# Patient Record
Sex: MALE | Race: WHITE | Employment: FULL TIME | URBAN - METROPOLITAN AREA
[De-identification: names, ages, dates, MRNs, and addresses within clinical notes are randomized per-mention and may not be internally consistent; named-entity substitution may affect disease eponyms.]

---

## 2018-07-16 ENCOUNTER — HOSPITAL ENCOUNTER (EMERGENCY)
Age: 43
Discharge: HOME | End: 2018-07-16
Attending: EMERGENCY MEDICINE
Payer: COMMERCIAL

## 2018-07-16 VITALS
BODY MASS INDEX: 30.46 KG/M2 | SYSTOLIC BLOOD PRESSURE: 131 MMHG | OXYGEN SATURATION: 99 % | HEART RATE: 92 BPM | HEIGHT: 66 IN | RESPIRATION RATE: 16 BRPM | DIASTOLIC BLOOD PRESSURE: 71 MMHG | TEMPERATURE: 98.4 F | WEIGHT: 189.56 LBS

## 2018-07-16 DIAGNOSIS — E86.0 DEHYDRATION: ICD-10-CM

## 2018-07-16 DIAGNOSIS — F10.920 ALCOHOLIC INTOXICATION WITHOUT COMPLICATION (HCC): Primary | ICD-10-CM

## 2018-07-16 LAB
ALBUMIN SERPL-MCNC: 4.1 G/DL (ref 3.5–5)
ALBUMIN/GLOB SERPL: 1 {RATIO} (ref 1.1–2.2)
ALP SERPL-CCNC: 83 U/L (ref 45–117)
ALT SERPL-CCNC: 31 U/L (ref 12–78)
AMPHET UR QL SCN: NEGATIVE
ANION GAP SERPL CALC-SCNC: 14 MMOL/L (ref 5–15)
AST SERPL-CCNC: 25 U/L (ref 15–37)
BARBITURATES UR QL SCN: NEGATIVE
BASOPHILS # BLD: 0 K/UL (ref 0–0.1)
BASOPHILS NFR BLD: 0 % (ref 0–1)
BENZODIAZ UR QL: NEGATIVE
BILIRUB SERPL-MCNC: 0.4 MG/DL (ref 0.2–1)
BUN SERPL-MCNC: 32 MG/DL (ref 6–20)
BUN/CREAT SERPL: 22 (ref 12–20)
CALCIUM SERPL-MCNC: 9 MG/DL (ref 8.5–10.1)
CANNABINOIDS UR QL SCN: NEGATIVE
CHLORIDE SERPL-SCNC: 109 MMOL/L (ref 97–108)
CO2 SERPL-SCNC: 19 MMOL/L (ref 21–32)
COCAINE UR QL SCN: NEGATIVE
CREAT SERPL-MCNC: 1.46 MG/DL (ref 0.7–1.3)
DIFFERENTIAL METHOD BLD: ABNORMAL
DRUG SCRN COMMENT,DRGCM: NORMAL
EOSINOPHIL # BLD: 0 K/UL (ref 0–0.4)
EOSINOPHIL NFR BLD: 0 % (ref 0–7)
ERYTHROCYTE [DISTWIDTH] IN BLOOD BY AUTOMATED COUNT: 12.6 % (ref 11.5–14.5)
ETHANOL SERPL-MCNC: 286 MG/DL
GLOBULIN SER CALC-MCNC: 4 G/DL (ref 2–4)
GLUCOSE SERPL-MCNC: 61 MG/DL (ref 65–100)
HCT VFR BLD AUTO: 42.7 % (ref 36.6–50.3)
HGB BLD-MCNC: 14.5 G/DL (ref 12.1–17)
IMM GRANULOCYTES # BLD: 0 K/UL (ref 0–0.04)
IMM GRANULOCYTES NFR BLD AUTO: 0 % (ref 0–0.5)
LYMPHOCYTES # BLD: 2.6 K/UL (ref 0.8–3.5)
LYMPHOCYTES NFR BLD: 22 % (ref 12–49)
MCH RBC QN AUTO: 32.1 PG (ref 26–34)
MCHC RBC AUTO-ENTMCNC: 34 G/DL (ref 30–36.5)
MCV RBC AUTO: 94.5 FL (ref 80–99)
METHADONE UR QL: NEGATIVE
MONOCYTES # BLD: 0.4 K/UL (ref 0–1)
MONOCYTES NFR BLD: 3 % (ref 5–13)
NEUTS SEG # BLD: 8.9 K/UL (ref 1.8–8)
NEUTS SEG NFR BLD: 75 % (ref 32–75)
NRBC # BLD: 0 K/UL (ref 0–0.01)
NRBC BLD-RTO: 0 PER 100 WBC
OPIATES UR QL: NEGATIVE
PCP UR QL: NEGATIVE
PLATELET # BLD AUTO: 282 K/UL (ref 150–400)
PMV BLD AUTO: 9.7 FL (ref 8.9–12.9)
POTASSIUM SERPL-SCNC: 4 MMOL/L (ref 3.5–5.1)
PROT SERPL-MCNC: 8.1 G/DL (ref 6.4–8.2)
RBC # BLD AUTO: 4.52 M/UL (ref 4.1–5.7)
RBC MORPH BLD: ABNORMAL
SODIUM SERPL-SCNC: 142 MMOL/L (ref 136–145)
WBC # BLD AUTO: 11.9 K/UL (ref 4.1–11.1)

## 2018-07-16 PROCEDURE — 96361 HYDRATE IV INFUSION ADD-ON: CPT

## 2018-07-16 PROCEDURE — 36415 COLL VENOUS BLD VENIPUNCTURE: CPT | Performed by: PHYSICIAN ASSISTANT

## 2018-07-16 PROCEDURE — 96360 HYDRATION IV INFUSION INIT: CPT

## 2018-07-16 PROCEDURE — 80053 COMPREHEN METABOLIC PANEL: CPT | Performed by: PHYSICIAN ASSISTANT

## 2018-07-16 PROCEDURE — 90791 PSYCH DIAGNOSTIC EVALUATION: CPT

## 2018-07-16 PROCEDURE — 80307 DRUG TEST PRSMV CHEM ANLYZR: CPT | Performed by: PHYSICIAN ASSISTANT

## 2018-07-16 PROCEDURE — 99285 EMERGENCY DEPT VISIT HI MDM: CPT

## 2018-07-16 PROCEDURE — 74011250636 HC RX REV CODE- 250/636: Performed by: EMERGENCY MEDICINE

## 2018-07-16 PROCEDURE — 85025 COMPLETE CBC W/AUTO DIFF WBC: CPT | Performed by: PHYSICIAN ASSISTANT

## 2018-07-16 PROCEDURE — 80307 DRUG TEST PRSMV CHEM ANLYZR: CPT | Performed by: EMERGENCY MEDICINE

## 2018-07-16 RX ADMIN — SODIUM CHLORIDE 1000 ML: 900 INJECTION, SOLUTION INTRAVENOUS at 20:27

## 2018-07-16 RX ADMIN — SODIUM CHLORIDE 1000 ML: 900 INJECTION, SOLUTION INTRAVENOUS at 19:38

## 2018-07-16 NOTE — ED NOTES
3:15 PM  I have evaluated the patient as the Provider in Triage. I have reviewed His vital signs and the triage nurse assessment. I have talked with the patient and any available family and advised that I am the provider in triage and have ordered the appropriate study to initiate their work up based on the clinical presentation during my assessment. I have advised that the patient will be accommodated in the Main ED as soon as possible. I have also requested to contact the triage nurse or myself immediately if the patient experiences any changes in their condition during this brief waiting period. Patient reports drinking 350 ml of vodka today, presents for alcohol detox. Endorses baseline back pain and right foot pain. Denies N/V.    VANIA Pretty

## 2018-07-16 NOTE — ED PROVIDER NOTES
HPI Comments: 36 y.o. male with past medical history significant for HTN and a-fib who presents from home accompanied by his wife with chief complaint of alcohol intoxication. Pt states that he has been drinking a pint of EtOH a day for the past 3 days and is trying to quit drinking. He had only previously been sober for one week. Pt reports that he feels sick with shaking if he does not drink and occasionally vomits. He states that he \"can go days without drinking but once (he) starts he can't stop. \" His last drink was just PTA. He has been missing work recently and has had a couple of near-syncopal episodes at work. He reports a hx of DTs. Pt has a hx of HTN and a-fib and takes Amlodipine and Metoprolol daily. No hx of AA or outpatient detox program. There are no other acute medical concerns at this time. Social hx: (+) vape/E-cigarette use; (+) EtOH use (355 mL vodka/day); (+) marijuana use; works in construction    Note written by Ruel Mcclelland, as dictated by Gena Hope MD 7:19 PM    The history is provided by the patient and the spouse. No  was used. Past Medical History:   Diagnosis Date    Alcoholic hepatitis     Atrial fibrillation ([address]     Hypertension     Ill-defined condition     a-fib       History reviewed. No pertinent surgical history. History reviewed. No pertinent family history. Social History     Social History    Marital status:      Spouse name: N/A    Number of children: N/A    Years of education: N/A     Occupational History    Not on file.      Social History Main Topics    Smoking status: Former Smoker    Smokeless tobacco: Never Used    Alcohol use Yes      Comment: 355 ml vodka daily    Drug use: Yes     Special: Marijuana    Sexual activity: Not on file     Other Topics Concern    Not on file     Social History Narrative    No narrative on file         ALLERGIES: Review of patient's allergies indicates no known allergies. Review of Systems   Constitutional: Negative for appetite change, chills and fever. HENT: Negative for rhinorrhea, sore throat and trouble swallowing. Eyes: Negative for photophobia. Respiratory: Negative for cough and shortness of breath. Cardiovascular: Negative for chest pain and palpitations. Gastrointestinal: Negative for abdominal pain, nausea and vomiting. Genitourinary: Negative for dysuria, frequency and hematuria. Musculoskeletal: Negative for arthralgias. Neurological: Negative for dizziness, syncope and weakness. Psychiatric/Behavioral: Positive for behavioral problems (EtOH abuse). The patient is not nervous/anxious. All other systems reviewed and are negative. Vitals:    07/16/18 2000 07/16/18 2015 07/16/18 2030 07/16/18 2045   BP: 120/70 125/71 132/67 116/67   Pulse: 81 89 88 85   Resp: 17 18 12 16   Temp:       SpO2: 96% 94% 96% 96%   Weight:       Height:                Physical Exam   Constitutional: He appears well-developed and well-nourished. Milo complexion. HENT:   Head: Normocephalic and atraumatic. Mouth/Throat: Oropharynx is clear and moist.   Eyes: EOM are normal. Pupils are equal, round, and reactive to light. Neck: Normal range of motion. Neck supple. Cardiovascular: Normal rate, regular rhythm, normal heart sounds and intact distal pulses. Exam reveals no gallop and no friction rub. No murmur heard. Pulmonary/Chest: Effort normal. No respiratory distress. He has no wheezes. He has no rales. Abdominal: Soft. There is no tenderness. There is no rebound. Musculoskeletal: Normal range of motion. He exhibits no tenderness. Neurological: He is alert. No cranial nerve deficit. Motor; symmetric   Skin: No erythema. Psychiatric: He has a normal mood and affect. His behavior is normal.   Nursing note and vitals reviewed.      Note written by Ruel Whelan, as dictated by Carmelita De La Torre MD 7:19 PM    [facility]      ED Course Procedures    PROGRESS NOTE:  8:00 PM  [facility] is evaluating the pt. CONSULT NOTE:  8:13 PM Gena Hope MD spoke with Tesha Yoon for [facility]. Discussed available diagnostic tests and clinical findings. She states that she will have the connect the pt with the [facility] [address].

## 2018-07-17 NOTE — ED NOTES
Pt given discharge instructions. Pt appears alert and oriented. Pt ambulated out of department with steady gait.

## 2018-07-17 NOTE — BSMART NOTE
Patient arrives with family due to alcohol intoxication and needing detox. Patient did not meed criteria for a medical admission and were upset due to being told by insurance to come here. Patient denies suicidal and homicidal ideation. He denies hallucinations and reports that he has been drinking for 25 years and he has decided he needs help. He is currently intoxicated and asked that his wife answer questions an be given information. Wife reports patient becomes very intoxicated and falls and talks to walls and will say he is suicidal but does not do anything and she is tired of taking care of him. She is requesting a residential facility and detox. Discussed options and family is wanting to call [facility] and gave permission for this writer to initiate contact while patient is being medically cleared. Wife contacted by Ann Acosta and patient has a plane flight to [facility] [place] 2[place] for the center there that leaves at 610 tomorrow. Patient and wife are appreciative of efforts and plan and will be discharged once medically cleared.     Marybel Farias [facility]

## 2018-07-17 NOTE — DISCHARGE INSTRUCTIONS
Dehydration: Care Instructions  Your Care Instructions  Dehydration happens when your body loses too much fluid. This might happen when you do not drink enough water or you lose large amounts of fluids from your body because of diarrhea, vomiting, or sweating. Severe dehydration can be life-threatening. Water and minerals called electrolytes help put your body fluids back in balance. Learn the early signs of fluid loss, and drink more fluids to prevent dehydration. Follow-up care is a key part of your treatment and safety. Be sure to make and go to all appointments, and call your doctor if you are having problems. It's also a good idea to know your test results and keep a list of the medicines you take. How can you care for yourself at home? · To prevent dehydration, drink plenty of fluids, enough so that your urine is light yellow or clear like water. Choose water and other caffeine-free clear liquids until you feel better. If you have kidney, heart, or liver disease and have to limit fluids, talk with your doctor before you increase the amount of fluids you drink. · If you do not feel like eating or drinking, try taking small sips of water, sports drinks, or other rehydration drinks. · Get plenty of rest.  To prevent dehydration  · Add more fluids to your diet and daily routine, unless your doctor has told you not to. · During hot weather, drink more fluids. Drink even more fluids if you exercise a lot. Stay away from drinks with alcohol or caffeine. · Watch for the symptoms of dehydration. These include:  ¨ A dry, sticky mouth. ¨ Dark yellow urine, and not much of it. ¨ Dry and sunken eyes. ¨ Feeling very tired. · Learn what problems can lead to dehydration. These include:  ¨ Diarrhea, fever, and vomiting. ¨ Any illness with a fever, such as pneumonia or the flu. ¨ Activities that cause heavy sweating, such as endurance races and heavy outdoor work in hot or humid weather.   ¨ Alcohol or drug abuse or withdrawal.  ¨ Certain medicines, such as cold and allergy pills (antihistamines), diet pills (diuretics), and laxatives. ¨ Certain diseases, such as diabetes, cancer, and heart or kidney disease. When should you call for help? Call 911 anytime you think you may need emergency care. For example, call if:    · You passed out (lost consciousness).    Call your doctor now or seek immediate medical care if:    · You are confused and cannot think clearly.     · You are dizzy or lightheaded, or you feel like you may faint.     · You have signs of needing more fluids. You have sunken eyes and a dry mouth, and you pass only a little dark urine.     · You cannot keep fluids down.    Watch closely for changes in your health, and be sure to contact your doctor if:    · You are not making tears.     · Your skin is very dry and sags slowly back into place after you pinch it.     · Your mouth and eyes are very dry. Where can you learn more? Go to http://wali-isidro.info/. Enter Z316 in the search box to learn more about \"Dehydration: Care Instructions. \"  Current as of: November 20, 2017  Content Version: 11.7  © 4054-6219 [employer]. Care instructions adapted under license by [employer] (which disclaims liability or warranty for this information). If you have questions about a medical condition or this instruction, always ask your healthcare professional. [place] [address] any warranty or liability for your use of this information.

## 2018-11-13 ENCOUNTER — OFFICE VISIT (OUTPATIENT)
Dept: INTERNAL MEDICINE CLINIC | Age: 43
End: 2018-11-13

## 2018-11-13 VITALS
BODY MASS INDEX: 31.5 KG/M2 | OXYGEN SATURATION: 95 % | WEIGHT: 196 LBS | HEART RATE: 113 BPM | SYSTOLIC BLOOD PRESSURE: 122 MMHG | RESPIRATION RATE: 16 BRPM | TEMPERATURE: 99.2 F | HEIGHT: 66 IN | DIASTOLIC BLOOD PRESSURE: 88 MMHG

## 2018-11-13 DIAGNOSIS — I10 ESSENTIAL HYPERTENSION: Primary | ICD-10-CM

## 2018-11-13 DIAGNOSIS — F10.11 ALCOHOL ABUSE, IN REMISSION: ICD-10-CM

## 2018-11-13 DIAGNOSIS — S09.93XA DENTAL INJURY, INITIAL ENCOUNTER: ICD-10-CM

## 2018-11-13 RX ORDER — OXYCODONE AND ACETAMINOPHEN 10; 325 MG/1; MG/1
TABLET ORAL
COMMUNITY
Start: 2018-11-12 | End: 2018-11-28 | Stop reason: ALTCHOICE

## 2018-11-13 RX ORDER — AMLODIPINE AND BENAZEPRIL HYDROCHLORIDE 5; 20 MG/1; MG/1
CAPSULE ORAL
Refills: 11 | COMMUNITY
Start: 2018-10-10 | End: 2019-07-17 | Stop reason: SDUPTHER

## 2018-11-13 RX ORDER — TRAZODONE HYDROCHLORIDE 50 MG/1
TABLET ORAL
Refills: 1 | COMMUNITY
Start: 2018-10-22 | End: 2018-11-28

## 2018-11-13 RX ORDER — METOPROLOL SUCCINATE 25 MG/1
TABLET, EXTENDED RELEASE ORAL
Refills: 10 | COMMUNITY
Start: 2018-10-25 | End: 2019-07-17 | Stop reason: SDUPTHER

## 2018-11-13 RX ORDER — DULOXETIN HYDROCHLORIDE 60 MG/1
CAPSULE, DELAYED RELEASE ORAL
Refills: 1 | COMMUNITY
Start: 2018-10-18 | End: 2019-12-16 | Stop reason: SDDI

## 2018-11-13 RX ORDER — GABAPENTIN 600 MG/1
TABLET ORAL
Refills: 1 | COMMUNITY
Start: 2018-10-22 | End: 2018-11-28 | Stop reason: SDUPTHER

## 2018-11-13 NOTE — PROGRESS NOTES
HISTORY OF PRESENT ILLNESS  Ken Irwin is a 39 y.o. male. HPI Mr. Garry Conklin is seen today for an introductory visit with concerns regarding hypertension and alcoholism. He is kindly referred by his father Shari Colon who has come to my practice for many years. Social history notable for him moving back from the [employer] about a year ago. 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 He is  with one teenage daughter. He goes by [email or web address]. \"     1. Hypertension. Stable on current regimen. 2. Alcoholism. He has had longstanding alcohol abuse. It was affecting his wife, relationships and he finally sought attention with the help of his wife. He was admitted for detox to [facility] over the summer and eventually went to a rehab facility in [place] in July. He is being maintained on Gabapentin and Cymbalta. No aftercare was recommended apparently. I suggested AA or possibly some counseling and he did not feel this was necessary. 3. Preventive medicine. He will have a full physical in six months. Review of systems notable for 6/10 mouth pain. Just this week he broke two front teeth. He struck his mouth on a large countertop that he and his brother were moving. Past Medical History:   Diagnosis Date    Alcohol abuse     Alcoholic hepatitis     Anxiety     Atrial [address] [address]     Dental injury 10/2018    Hypertension     Ill-defined condition     a-fib    Renal failure      History reviewed. No pertinent surgical history.     Current Outpatient Medications   Medication Sig    DULoxetine (CYMBALTA) 60 mg capsule TK ONE C PO  QD    amLODIPine-benazepril (LOTREL) 5-20 mg per capsule TK 1 C PO QD    gabapentin (NEURONTIN) 600 mg tablet TK 1 T PO TID    traZODone (DESYREL) 50 mg tablet TK 1 T PO QHS    oxyCODONE-acetaminophen (PERCOCET 10)  mg per tablet     metoprolol succinate (TOPROL-XL) 25 mg XL tablet TK 1 T PO QD     No current facility-administered medications for this visit. No Known Allergies    Family History   Problem Relation Age of Onset   Michail Schwab Breast Cancer Mother     Hypertension Father     Hypertension Brother     Heart Disease Maternal Grandfather     Alcohol abuse Maternal Grandmother     Alcohol abuse Paternal Grandfather          Review of Systems   Constitutional: Negative for weight loss. Respiratory: Negative. Cardiovascular: Negative for chest pain, palpitations, leg swelling and PND. Musculoskeletal: Negative for myalgias. Neurological: Negative for focal weakness. Psychiatric/Behavioral: Negative for depression. The patient is not nervous/anxious. All other systems reviewed and are negative. Physical Exam   Constitutional: No distress. HENT:   Mouth/Throat: Abnormal dentition. Eyes: Conjunctivae are normal. Right eye exhibits no discharge. Left eye exhibits no discharge. Neck: Neck supple. Carotid bruit is not present. Cardiovascular: Normal rate and regular rhythm. Exam reveals no gallop and no friction rub. No murmur heard. Pulmonary/Chest: Effort normal and breath sounds normal. No respiratory distress. Abdominal: Soft. He exhibits no distension. Musculoskeletal: He exhibits no edema. Lymphadenopathy:     He has no cervical adenopathy. Neurological: He is alert. Skin: Skin is warm and dry. Psychiatric: He has a normal mood and affect. His behavior is normal.   Nursing note and vitals reviewed. ASSESSMENT and PLAN  Diagnoses and all orders for this visit:    1. Essential hypertension- Continue current regimen of prescription and / or OTC medications     2. Alcohol abuse, in remission- Continue current regimen of prescription and / or OTC medications     3. Dental injury, initial encounter- See specialists  as directed.

## 2018-11-28 ENCOUNTER — OFFICE VISIT (OUTPATIENT)
Dept: INTERNAL MEDICINE CLINIC | Age: 43
End: 2018-11-28

## 2018-11-28 VITALS
RESPIRATION RATE: 16 BRPM | BODY MASS INDEX: 33.11 KG/M2 | HEIGHT: 66 IN | DIASTOLIC BLOOD PRESSURE: 90 MMHG | OXYGEN SATURATION: 97 % | HEART RATE: 78 BPM | WEIGHT: 206 LBS | TEMPERATURE: 98.3 F | SYSTOLIC BLOOD PRESSURE: 137 MMHG

## 2018-11-28 DIAGNOSIS — F10.11 ALCOHOL ABUSE, IN REMISSION: ICD-10-CM

## 2018-11-28 DIAGNOSIS — M54.42 ACUTE LEFT-SIDED LOW BACK PAIN WITH LEFT-SIDED SCIATICA: Primary | ICD-10-CM

## 2018-11-28 DIAGNOSIS — F51.04 PSYCHOPHYSIOLOGICAL INSOMNIA: ICD-10-CM

## 2018-11-28 RX ORDER — DICLOFENAC SODIUM 75 MG/1
75 TABLET, DELAYED RELEASE ORAL
Qty: 30 TAB | Refills: 1 | Status: SHIPPED | OUTPATIENT
Start: 2018-11-28 | End: 2019-12-16 | Stop reason: ALTCHOICE

## 2018-11-28 RX ORDER — METHYLPREDNISOLONE 4 MG/1
4 TABLET ORAL
Qty: 1 DOSE PACK | Refills: 0 | Status: SHIPPED | OUTPATIENT
Start: 2018-11-28 | End: 2019-01-28 | Stop reason: ALTCHOICE

## 2018-11-28 RX ORDER — IBUPROFEN 200 MG
200 TABLET ORAL
COMMUNITY
End: 2018-11-28

## 2018-11-28 RX ORDER — GABAPENTIN 600 MG/1
TABLET ORAL
Qty: 90 TAB | Refills: 5 | Status: SHIPPED | OUTPATIENT
Start: 2018-11-28 | End: 2019-06-11 | Stop reason: SDUPTHER

## 2018-11-28 RX ORDER — TRAZODONE HYDROCHLORIDE 100 MG/1
TABLET ORAL
Qty: 30 TAB | Refills: 11 | Status: SHIPPED | OUTPATIENT
Start: 2018-11-28 | End: 2020-04-13 | Stop reason: SDUPTHER

## 2018-11-28 RX ORDER — ACETAMINOPHEN 325 MG/1
TABLET ORAL
Status: ON HOLD | COMMUNITY
End: 2020-09-09

## 2018-11-28 NOTE — PATIENT INSTRUCTIONS
Low Back Pain: Exercises Your Care Instructions Here are some examples of typical rehabilitation exercises for your condition. Start each exercise slowly. Ease off the exercise if you start to have pain. Your doctor or physical therapist will tell you when you can start these exercises and which ones will work best for you. How to do the exercises Press-up 1. Lie on your stomach, supporting your body with your forearms. 2. Press your elbows down into the floor to raise your upper back. As you do this, relax your stomach muscles and allow your back to arch without using your back muscles. As your press up, do not let your hips or pelvis come off the floor. 3. Hold for 15 to 30 seconds, then relax. 4. Repeat 2 to 4 times. Alternate arm and leg (bird dog) exercise 1. Start on the floor, on your hands and knees. 2. Tighten your belly muscles. 3. Raise one leg off the floor, and hold it straight out behind you. Be careful not to let your hip drop down, because that will twist your trunk. 4. Hold for about 6 seconds, then lower your leg and switch to the other leg. 5. Repeat 8 to 12 times on each leg. 6. Over time, work up to holding for 10 to 30 seconds each time. 7. If you feel stable and secure with your leg raised, try raising the opposite arm straight out in front of you at the same time. Knee-to-chest exercise 1. Lie on your back with your knees bent and your feet flat on the floor. 2. Bring one knee to your chest, keeping the other foot flat on the floor (or keeping the other leg straight, whichever feels better on your lower back). 3. Keep your lower back pressed to the floor. Hold for at least 15 to 30 seconds. 4. Relax, and lower the knee to the starting position. 5. Repeat with the other leg. Repeat 2 to 4 times with each leg. 6. To get more stretch, put your other leg flat on the floor while pulling your knee to your chest. 
 
Curl-ups 1. Lie on the floor on your back with your knees bent at a 90-degree angle. Your feet should be flat on the floor, about 12 inches from your buttocks. 2. Cross your arms over your chest. If this bothers your neck, try putting your hands behind your neck (not your head), with your elbows spread apart. 3. Slowly tighten your belly muscles and raise your shoulder blades off the floor. 4. Keep your head in line with your body, and do not press your chin to your chest. 
5. Hold this position for 1 or 2 seconds, then slowly lower yourself back down to the floor. 6. Repeat 8 to 12 times. Pelvic tilt exercise 1. Lie on your back with your knees bent. 2. \"Brace\" your stomach. This means to tighten your muscles by pulling in and imagining your belly button moving toward your spine. You should feel like your back is pressing to the floor and your hips and pelvis are rocking back. 3. Hold for about 6 seconds while you breathe smoothly. 4. Repeat 8 to 12 times. Heel dig bridging 1. Lie on your back with both knees bent and your ankles bent so that only your heels are digging into the floor. Your knees should be bent about 90 degrees. 2. Then push your heels into the floor, squeeze your buttocks, and lift your hips off the floor until your shoulders, hips, and knees are all in a straight line. 3. Hold for about 6 seconds as you continue to breathe normally, and then slowly lower your hips back down to the floor and rest for up to 10 seconds. 4. Do 8 to 12 repetitions. Hamstring stretch in doorway 1. Lie on your back in a doorway, with one leg through the open door. 2. Slide your leg up the wall to straighten your knee. You should feel a gentle stretch down the back of your leg. 3. Hold the stretch for at least 15 to 30 seconds. Do not arch your back, point your toes, or bend either knee. Keep one heel touching the floor and the other heel touching the wall. 4. Repeat with your other leg. 5. Do 2 to 4 times for each leg. Hip flexor stretch 1. Kneel on the floor with one knee bent and one leg behind you. Place your forward knee over your foot. Keep your other knee touching the floor. 2. Slowly push your hips forward until you feel a stretch in the upper thigh of your rear leg. 3. Hold the stretch for at least 15 to 30 seconds. Repeat with your other leg. 4. Do 2 to 4 times on each side. Wall sit 1. Stand with your back 10 to 12 inches away from a wall. 2. Lean into the wall until your back is flat against it. 3. Slowly slide down until your knees are slightly bent, pressing your lower back into the wall. 4. Hold for about 6 seconds, then slide back up the wall. 5. Repeat 8 to 12 times. Follow-up care is a key part of your treatment and safety. Be sure to make and go to all appointments, and call your doctor if you are having problems. It's also a good idea to know your test results and keep a list of the medicines you take. Where can you learn more? Go to http://wali-isidro.info/. Enter B985 in the search box to learn more about \"Low Back Pain: Exercises. \" Current as of: November 29, 2017 Content Version: 11.8 © 0291-7740 [employer]. Care instructions adapted under license by [employer] (which disclaims liability or warranty for this information). If you have questions about a medical condition or this instruction, always ask your healthcare professional. [place] [address] any warranty or liability for your use of this information.

## 2018-11-28 NOTE — PROGRESS NOTES
HISTORY OF PRESENT ILLNESS Michelle Castillo is a 39 y.o. male. Insomnia The history is provided by the patient (having less benefit from 50 mg trazodone). This is a chronic problem. Back Pain The history is provided by the patient. This is a new problem. The current episode started yesterday. The problem has not changed since onset. The problem occurs constantly. Patient reports not work related injury. The pain is associated with a fall (missed last step . Caught himself but wrenched the back). The pain is present in the lower back and left side. The quality of the pain is described as aching. The pain radiates to the left thigh, left knee and left foot. The pain is at a severity of 8/10. The symptoms are aggravated by bending, twisting and certain positions. The pain is the same all the time. Associated symptoms include leg pain. Pertinent negatives include no numbness, no paresthesias, no paresis and no tingling. Associated symptoms comments: Leg pain is r groin, c/w strain. Treatments tried: ibuprofen  The treatment provided no relief. The patient's surgical history non-contributory   Has known DDD, with recurrent strain Review of Systems Musculoskeletal: Positive for back pain. Neurological: Negative for tingling, numbness and paresthesias. Psychiatric/Behavioral: The patient has insomnia. Physical Exam  
Constitutional: No distress. Musculoskeletal:  
     Lumbar back: He exhibits decreased range of motion and tenderness. He exhibits no bony tenderness, no swelling, no deformity and no spasm. Back: 
 
Neurological: He has normal strength. No sensory deficit. Reflex Scores: 
     Patellar reflexes are 3+ on the right side and 3+ on the left side. Normal motor and sensory exam, negative straight leg raising test bilateral   
Nursing note and vitals reviewed. ASSESSMENT and PLAN Diagnoses and all orders for this visit: 
 
 1. Acute left-sided low back pain with left-sided sciatica 
-     methylPREDNISolone (MEDROL DOSEPACK) 4 mg tablet; Take 1 Tab by mouth Specific Days and Specific Times. 
-     diclofenac EC (VOLTAREN) 75 mg EC tablet; Take 1 Tab by mouth two (2) times daily as needed. For pain. Start after completion of 4 days of Medrol - Stretching, See patient instructions 2. Psychophysiological insomnia 
-     traZODone (DESYREL) 100 mg tablet; TK 1 T PO QHS- new strength 3. Alcohol abuse, in remission 
-     gabapentin (NEURONTIN) 600 mg tablet; TK 1 T PO TID

## 2019-01-28 ENCOUNTER — OFFICE VISIT (OUTPATIENT)
Dept: INTERNAL MEDICINE CLINIC | Age: 44
End: 2019-01-28

## 2019-01-28 ENCOUNTER — HOSPITAL ENCOUNTER (OUTPATIENT)
Dept: GENERAL RADIOLOGY | Age: 44
Discharge: HOME | End: 2019-01-28
Attending: INTERNAL MEDICINE
Payer: COMMERCIAL

## 2019-01-28 ENCOUNTER — TELEPHONE (OUTPATIENT)
Dept: INTERNAL MEDICINE CLINIC | Age: 44
End: 2019-01-28

## 2019-01-28 VITALS
DIASTOLIC BLOOD PRESSURE: 91 MMHG | OXYGEN SATURATION: 95 % | HEART RATE: 91 BPM | BODY MASS INDEX: 33.07 KG/M2 | RESPIRATION RATE: 19 BRPM | HEIGHT: 66 IN | WEIGHT: 205.8 LBS | TEMPERATURE: 98.3 F | SYSTOLIC BLOOD PRESSURE: 150 MMHG

## 2019-01-28 DIAGNOSIS — M25.571 ACUTE RIGHT ANKLE PAIN: ICD-10-CM

## 2019-01-28 DIAGNOSIS — M25.571 ACUTE RIGHT ANKLE PAIN: Primary | ICD-10-CM

## 2019-01-28 PROCEDURE — 73610 X-RAY EXAM OF ANKLE: CPT

## 2019-01-28 NOTE — PROGRESS NOTES
Acute Care Note    JOSE MOYER is 39 y.o. male. he presents for evaluation of Foot Injury    The patient was at a store earlier today. He was attempting to retrieve something from a high shelf. He went to stretch for the object and was standing on his toes on the right foot. He felt and heard a pop and had a significant amount of pain thereafter localized to the lateral malleolus. He presents for evaluation of this pain    Prior to Admission medications    Medication Sig Start Date End Date Taking? Authorizing Provider   acetaminophen (TYLENOL) 325 mg tablet Take  by mouth every four (4) hours as needed for Pain. Yes Provider, Historical   gabapentin (NEURONTIN) 600 mg tablet TK 1 T PO TID 11/28/18  Yes Reba Riley MD   diclofenac EC (VOLTAREN) 75 mg EC tablet Take 1 Tab by mouth two (2) times daily as needed. For pain 11/28/18  Yes Reba Riley MD   traZODone (DESYREL) 100 mg tablet TK 1 T PO QHS- new strength 11/28/18  Yes Reba Riley MD   DULoxetine (CYMBALTA) 60 mg capsule TK ONE C PO  QD 10/18/18  Yes Provider, Historical   amLODIPine-benazepril (LOTREL) 5-20 mg per capsule TK 1 C PO QD 10/10/18  Yes Provider, Historical   metoprolol succinate (TOPROL-XL) 25 mg XL tablet TK 1 T PO QD 10/25/18  Yes Provider, Historical         There is no problem list on file for this patient. Review of Systems   Constitutional: Negative. Respiratory: Negative. Cardiovascular: Negative. Musculoskeletal:        Foot pain as per HPI         Visit Vitals  BP (!) 150/91 (BP 1 Location: Right arm, BP Patient Position: Sitting)   Pulse 91   Temp 98.3 °F (36.8 °C) (Oral)   Resp 19   Ht 5' 6\" (1.676 m)   Wt 205 lb 12.8 oz (93.4 kg)   SpO2 95%   BMI 33.22 kg/m²       Physical Exam   Musculoskeletal:   Noted swelling along the lateral aspect of the right foot near the lateral malleolus.   There is some mild to moderate tenderness palpation at the same area ASSESSMENT/PLAN  Diagnoses and all orders for this visit:    1. Acute right ankle pain -unclear etiology for the present pain. I suspect that the patient has not fractured any bones within the ankle. Most likely, the symptoms represent a ligamentous strain. We will obtain an x-ray initially to determine if there does exist a visible injury. -     XR ANKLE RT MIN 3 V; Future         Advised the patient to call back or return to office if symptoms worsen/change/persist.   Discussed expected course/resolution/complications of diagnosis in detail with patient. Medication risks/benefits/costs/interactions/alternatives discussed with patient. The patient was given an after visit summary which includes diagnoses, current medications, & vitals. They expressed understanding with the diagnosis and plan.

## 2019-01-28 NOTE — PROGRESS NOTES
Please inform the patient that the x-ray of his ankle shows no acute abnormality. There is no evidence of soft tissue injury either. For now, this may be a sprain. Please utilize ice and rest as we had discussed. Inform the office if not improving in the next week.

## 2019-01-28 NOTE — PATIENT INSTRUCTIONS
Foot Pain: Care Instructions  Your Care Instructions  Foot injuries that cause pain and swelling are fairly common. Almost all sports or home repair projects can cause a misstep that ends up as foot pain. Normal wear and tear, especially as you get older, also can cause foot pain. Most minor foot injuries will heal on their own, and home treatment is usually all you need to do. If you have a severe injury, you may need tests and treatment. Follow-up care is a key part of your treatment and safety. Be sure to make and go to all appointments, and call your doctor if you are having problems. It's also a good idea to know your test results and keep a list of the medicines you take. How can you care for yourself at home? · Take pain medicines exactly as directed. ? If the doctor gave you a prescription medicine for pain, take it as prescribed. ? If you are not taking a prescription pain medicine, ask your doctor if you can take an over-the-counter medicine. · Rest and protect your foot. Take a break from any activity that may cause pain. · Put ice or a cold pack on your foot for 10 to 20 minutes at a time. Put a thin cloth between the ice and your skin. · Prop up the sore foot on a pillow when you ice it or anytime you sit or lie down during the next 3 days. Try to keep it above the level of your heart. This will help reduce swelling. · Your doctor may recommend that you wrap your foot with an elastic bandage. Keep your foot wrapped for as long as your doctor advises. · If your doctor recommends crutches, use them as directed. · Wear roomy footwear. · As soon as pain and swelling end, begin gentle exercises of your foot. Your doctor can tell you which exercises will help. When should you call for help? Call 911 anytime you think you may need emergency care.  For example, call if:    · Your foot turns pale, white, blue, or cold.    Call your doctor now or seek immediate medical care if:    · You cannot move or stand on your foot.     · Your foot looks twisted or out of its normal position.     · Your foot is not stable when you step down.     · You have signs of infection, such as:  ? Increased pain, swelling, warmth, or redness. ? Red streaks leading from the sore area. ? Pus draining from a place on your foot. ? A fever.     · Your foot is numb or tingly.    Watch closely for changes in your health, and be sure to contact your doctor if:    · You do not get better as expected.     · You have bruises from an injury that last longer than 2 weeks. Where can you learn more? Go to http://wali-isidro.info/. Enter R714 in the search box to learn more about \"Foot Pain: Care Instructions. \"  Current as of: September 20, 2018  Content Version: 11.9  © 8245-9749 [employer]. Care instructions adapted under license by [employer] (which disclaims liability or warranty for this information). If you have questions about a medical condition or this instruction, always ask your healthcare professional. [address] any warranty or liability for your use of this information.

## 2019-01-29 ENCOUNTER — TELEPHONE (OUTPATIENT)
Dept: INTERNAL MEDICINE CLINIC | Age: 44
End: 2019-01-29

## 2019-06-11 ENCOUNTER — TELEPHONE (OUTPATIENT)
Dept: INTERNAL MEDICINE CLINIC | Age: 44
End: 2019-06-11

## 2019-06-11 DIAGNOSIS — F10.11 ALCOHOL ABUSE, IN REMISSION: ICD-10-CM

## 2019-06-11 RX ORDER — GABAPENTIN 600 MG/1
TABLET ORAL
Qty: 90 TAB | Refills: 0 | Status: SHIPPED | OUTPATIENT
Start: 2019-06-11 | End: 2019-06-11 | Stop reason: SDUPTHER

## 2019-06-11 RX ORDER — GABAPENTIN 600 MG/1
TABLET ORAL
Qty: 90 TAB | Refills: 0 | Status: SHIPPED | OUTPATIENT
Start: 2019-06-11 | End: 2019-07-17 | Stop reason: SDUPTHER

## 2019-06-11 NOTE — TELEPHONE ENCOUNTER
Pt called back to say I sent his Rx to wrong pharmacy. Advised him that [facility] was the only pharmacy in his chart and he did not say in message he wanted it to go some where else. He said he forgot to say. Asked since Rx had been sent already [facility] and I would change is pharmacy in chart? He said no. Advised will call judy to cancel med there (done) and send to [facility] as he has requested (done).

## 2019-06-11 NOTE — TELEPHONE ENCOUNTER
----- Message from Vanesa Almaguer sent at 6/11/2019  7:49 AM EDT -----  Regarding: FW: Dr. Seth Sloan   From [facility]        ----- Message -----  From: Gentry Guallpa  Sent: 6/10/2019   5:03 PM  To: [employer] [facility] [employer]  Subject: Dr. Seth Sloan                            Pt is requesting a Rx refill for gabapentin (NEURONTIN) 600 mg tablet [[id number]] and he would like to contact when the Rx is ready. Best contact number is [phone].

## 2019-07-17 DIAGNOSIS — F10.11 ALCOHOL ABUSE, IN REMISSION: ICD-10-CM

## 2019-07-17 RX ORDER — AMLODIPINE AND BENAZEPRIL HYDROCHLORIDE 5; 20 MG/1; MG/1
CAPSULE ORAL
Qty: 30 CAP | Refills: 0 | Status: SHIPPED | OUTPATIENT
Start: 2019-07-17 | End: 2019-08-21 | Stop reason: SDUPTHER

## 2019-07-17 RX ORDER — METOPROLOL SUCCINATE 25 MG/1
TABLET, EXTENDED RELEASE ORAL
Qty: 30 TAB | Refills: 0 | Status: SHIPPED | OUTPATIENT
Start: 2019-07-17 | End: 2019-12-16 | Stop reason: SDUPTHER

## 2019-07-17 RX ORDER — GABAPENTIN 600 MG/1
TABLET ORAL
Qty: 30 TAB | Refills: 0 | OUTPATIENT
Start: 2019-07-17 | End: 2019-08-21 | Stop reason: SDUPTHER

## 2019-07-17 NOTE — TELEPHONE ENCOUNTER
Genesis Josue (Self) [phone] (H)     Refill on amlodipine, gabapentin and metoprolol to [facility].

## 2019-07-18 NOTE — TELEPHONE ENCOUNTER
Phoned in prescription below to patients pharmacy on file - verbal order received : Dr Mirta Vasquez.

## 2019-08-20 DIAGNOSIS — F10.11 ALCOHOL ABUSE, IN REMISSION: ICD-10-CM

## 2019-08-21 DIAGNOSIS — F10.11 ALCOHOL ABUSE, IN REMISSION: ICD-10-CM

## 2019-08-22 RX ORDER — AMLODIPINE AND BENAZEPRIL HYDROCHLORIDE 5; 20 MG/1; MG/1
CAPSULE ORAL
Qty: 30 CAP | Refills: 0 | Status: SHIPPED | OUTPATIENT
Start: 2019-08-22 | End: 2019-10-09 | Stop reason: SDUPTHER

## 2019-08-22 RX ORDER — GABAPENTIN 600 MG/1
TABLET ORAL
Qty: 90 TAB | Refills: 0 | OUTPATIENT
Start: 2019-08-22 | End: 2019-12-16 | Stop reason: SDUPTHER

## 2019-08-22 RX ORDER — GABAPENTIN 600 MG/1
TABLET ORAL
Qty: 30 TAB | Refills: 0 | OUTPATIENT
Start: 2019-08-22

## 2019-08-22 NOTE — TELEPHONE ENCOUNTER
Send in one month, then call to reschedule follow up as due .  [facility] for phone in gabapentin

## 2019-08-23 NOTE — TELEPHONE ENCOUNTER
Phoned in prescription below to patients pharmacy on file - verbal order received : Dr Ruth Ann Beck.

## 2019-10-08 ENCOUNTER — TELEPHONE (OUTPATIENT)
Dept: INTERNAL MEDICINE CLINIC | Age: 44
End: 2019-10-08

## 2019-10-08 DIAGNOSIS — I10 ESSENTIAL HYPERTENSION: Primary | ICD-10-CM

## 2019-10-08 RX ORDER — AMLODIPINE AND BENAZEPRIL HYDROCHLORIDE 5; 20 MG/1; MG/1
CAPSULE ORAL
Qty: 30 CAP | Refills: 0 | Status: CANCELLED | OUTPATIENT
Start: 2019-10-08

## 2019-10-08 NOTE — TELEPHONE ENCOUNTER
Patient states new insurance. Started new job. Next 30 days will be effective. Need refill on BP medication.

## 2019-10-08 NOTE — TELEPHONE ENCOUNTER
Patient states he is out of his blood pressure med, Amlodipine and needs it. Cannot come in because he is trying to get his insurance straight. Please call.

## 2019-10-09 RX ORDER — AMLODIPINE AND BENAZEPRIL HYDROCHLORIDE 5; 20 MG/1; MG/1
CAPSULE ORAL
Qty: 30 CAP | Refills: 1 | Status: SHIPPED | OUTPATIENT
Start: 2019-10-09 | End: 2019-12-16 | Stop reason: SDUPTHER

## 2019-10-16 DIAGNOSIS — F10.11 ALCOHOL ABUSE, IN REMISSION: ICD-10-CM

## 2019-10-16 RX ORDER — GABAPENTIN 600 MG/1
TABLET ORAL
Qty: 90 TAB | Refills: 0 | OUTPATIENT
Start: 2019-10-16

## 2019-12-16 ENCOUNTER — OFFICE VISIT (OUTPATIENT)
Dept: INTERNAL MEDICINE CLINIC | Age: 44
End: 2019-12-16

## 2019-12-16 VITALS
TEMPERATURE: 98.4 F | HEIGHT: 66 IN | SYSTOLIC BLOOD PRESSURE: 215 MMHG | DIASTOLIC BLOOD PRESSURE: 136 MMHG | BODY MASS INDEX: 33.49 KG/M2 | WEIGHT: 208.4 LBS | OXYGEN SATURATION: 96 % | RESPIRATION RATE: 20 BRPM | HEART RATE: 96 BPM

## 2019-12-16 DIAGNOSIS — I10 ESSENTIAL HYPERTENSION: ICD-10-CM

## 2019-12-16 DIAGNOSIS — F10.11 ALCOHOL ABUSE, IN REMISSION: ICD-10-CM

## 2019-12-16 DIAGNOSIS — I10 ACCELERATED HYPERTENSION: Primary | ICD-10-CM

## 2019-12-16 RX ORDER — AMLODIPINE AND BENAZEPRIL HYDROCHLORIDE 5; 20 MG/1; MG/1
CAPSULE ORAL
Qty: 30 CAP | Refills: 3 | Status: SHIPPED | OUTPATIENT
Start: 2019-12-16 | End: 2020-08-15

## 2019-12-16 RX ORDER — GABAPENTIN 600 MG/1
TABLET ORAL
Qty: 90 TAB | Refills: 0 | Status: SHIPPED | OUTPATIENT
Start: 2019-12-16 | End: 2020-02-21

## 2019-12-16 RX ORDER — METOPROLOL SUCCINATE 25 MG/1
TABLET, EXTENDED RELEASE ORAL
Qty: 30 TAB | Refills: 3 | Status: SHIPPED | OUTPATIENT
Start: 2019-12-16 | End: 2020-06-01

## 2019-12-16 RX ORDER — GABAPENTIN 600 MG/1
TABLET ORAL
Qty: 90 TAB | Refills: 0 | Status: CANCELLED | OUTPATIENT
Start: 2019-12-16

## 2019-12-16 NOTE — PROGRESS NOTES
HISTORY OF PRESENT ILLNESS  Harshal Franco is a 38 y.o. male. HPI Subjective:  Erin Naidu is seen today, overdue for routine follow up with concerns regarding carpal tunnel syndrome and foot pain. 1. His BP is markedly elevated today and he admits to being off his medication for \"a few weeks\". Elevated blood pressure was confirmed x three. He denies any current symptoms of headache, blurred vision, shortness of breath or chest pain. An EKG shows no acute changes. He admits to having a very stressful day. Due to the persistent severe elevation of blood pressure I advised ER transfer, but he declines. He feels all he needs to do is to go home and rest and I will send him his medication, which he promises to start tonight. Will start with Metoprolol 50 mg tonight and restart Lotrel in the morning. Emergency room red flags were reviewed. He seems to understand the stroke risk, which I reviewed with him on several occasions during the visit. 2. Carpal tunnel syndrome. This is chronic. He does use a wrist brace. When he returns tomorrow for follow up we will make orthopedic referral.  Symptoms have worsened with his new job. 3. Foot pain. Hold off on evaluation for now. 4. Alcohol use. He states he has 1-2 drinks now and then, but nothing heavy like he has had in the past.    Social History:  Notable for him having a new job. 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 Past Medical History:  Notable for severe hypertension, currently untreated. See assessment and plan for restart of medications. He agrees to come in tomorrow for a recheck of BP.     Current Outpatient Medications   Medication Sig    gabapentin (NEURONTIN) 600 mg tablet TAKE 1 TABLET BY MOUTH THREE TIMES DAILY    amLODIPine-benazepril (LOTREL) 5-20 mg per capsule TAKE 1 CAPSULE BY MOUTH ONCE DAILY  Indications: high blood pressure    metoprolol succinate (TOPROL-XL) 25 mg XL tablet TK 1 T PO NIGHTLY    acetaminophen (TYLENOL) 325 mg tablet Take  by mouth every four (4) hours as needed for Pain.  traZODone (DESYREL) 100 mg tablet TK 1 T PO QHS- new strength     No current facility-administered medications for this visit. Review of Systems   Constitutional: Negative for weight loss. Eyes: Negative for blurred vision. Respiratory: Negative. Cardiovascular: Negative for chest pain, palpitations, leg swelling and PND. Musculoskeletal: Negative for myalgias. Neurological: Negative for speech change, focal weakness and headaches. All other systems reviewed and are negative. Physical Exam  Vitals signs and nursing note reviewed. Constitutional:       General: He is not in acute distress. Neck:      Vascular: No carotid bruit. Cardiovascular:      Rate and Rhythm: Normal rate and regular rhythm. Heart sounds: No murmur. No friction rub. No gallop. Pulmonary:      Effort: Pulmonary effort is normal. No respiratory distress. Breath sounds: Normal breath sounds. Skin:     General: Skin is warm and dry. Neurological:      Mental Status: He is alert. Psychiatric:         Behavior: Behavior normal.         ASSESSMENT and PLAN  Diagnoses and all orders for this visit:    1. Accelerated hypertension  -     AMB POC EKG ROUTINE W/ 12 LEADS, INTER & REP  -     amLODIPine-benazepril (LOTREL) 5-20 mg per capsule; TAKE 1 CAPSULE BY MOUTH ONCE DAILY  Indications: high blood pressure  -     metoprolol succinate (TOPROL-XL) 25 mg XL tablet; TK 1 T PO NIGHTLY    2.  Alcohol abuse, in remission  -     gabapentin (NEURONTIN) 600 mg tablet; TAKE 1 TABLET BY MOUTH THREE TIMES DAILY    3. Essential hypertension  -     amLODIPine-benazepril (LOTREL) 5-20 mg per capsule; TAKE 1 CAPSULE BY MOUTH ONCE DAILY  Indications: high blood pressure  -     metoprolol succinate (TOPROL-XL) 25 mg XL tablet; TK 1 T PO NIGHTLY

## 2019-12-17 ENCOUNTER — OFFICE VISIT (OUTPATIENT)
Dept: INTERNAL MEDICINE CLINIC | Age: 44
End: 2019-12-17

## 2019-12-17 VITALS
WEIGHT: 210.4 LBS | HEART RATE: 83 BPM | BODY MASS INDEX: 33.82 KG/M2 | SYSTOLIC BLOOD PRESSURE: 164 MMHG | HEIGHT: 66 IN | TEMPERATURE: 98.7 F | DIASTOLIC BLOOD PRESSURE: 118 MMHG | OXYGEN SATURATION: 97 % | RESPIRATION RATE: 16 BRPM

## 2019-12-17 DIAGNOSIS — F10.11 ALCOHOL ABUSE, IN REMISSION: ICD-10-CM

## 2019-12-17 DIAGNOSIS — G89.29 CHRONIC FOOT PAIN, LEFT: ICD-10-CM

## 2019-12-17 DIAGNOSIS — M79.672 CHRONIC FOOT PAIN, LEFT: ICD-10-CM

## 2019-12-17 DIAGNOSIS — G56.01 CARPAL TUNNEL SYNDROME OF RIGHT WRIST: ICD-10-CM

## 2019-12-17 DIAGNOSIS — I10 ACCELERATED HYPERTENSION: Primary | ICD-10-CM

## 2019-12-17 NOTE — PROGRESS NOTES
Chief Complaint   Patient presents with    Hypertension     1. Have you been to the ER, urgent care clinic since your last visit? Hospitalized since your last visit? No    2. Have you seen or consulted any other health care providers outside of the [address] [place] [address] since your last visit? Include any pap smears or colon screening.  No

## 2019-12-17 NOTE — PROGRESS NOTES
HISTORY OF PRESENT ILLNESS  Umberto Palacios is a 38 y.o. male. HPI Subjective:  Holli Hill is seen today for follow up of marked BP elevation and other concerns. 1. Hypertension BP  levels are not optimal of course as he has only had two doses of medication. He denies any symptoms of malignant hypertension. He does feel better today. Will continue with his current regimen and see him back in one month. 2. Carpal tunnel syndrome, right greater than left. Will refer for orthopedic evaluation. 3. Left foot pain. It hurts on the dorsum of his foot. He denies any injury. He denies any swelling and does not relate it to a specific joint. Will refer for podiatry evaluation as he likely will need modified footwear. Social History:  Notable for him being out of health insurance during a job transition. His wife had a job transition as well. Reviewed with him that our practice would not turn him away even if he has no health insurance. Review of Systems   Constitutional: Negative for weight loss. Eyes: Negative for blurred vision. Respiratory: Negative. Cardiovascular: Negative for chest pain, palpitations, leg swelling and PND. Musculoskeletal: Positive for joint pain. Negative for myalgias. Neurological: Negative for focal weakness and headaches. Physical Exam  Vitals signs and nursing note reviewed. Constitutional:       General: He is not in acute distress. Cardiovascular:      Rate and Rhythm: Normal rate and regular rhythm. Pulses:           Radial pulses are 2+ on the right side. Heart sounds: No murmur. No friction rub. No gallop. Pulmonary:      Effort: Pulmonary effort is normal.      Breath sounds: Normal breath sounds. ASSESSMENT and PLAN  Diagnoses and all orders for this visit:    1. Accelerated hypertension  -     METABOLIC PANEL, COMPREHENSIVE  -     CBC WITH AUTOMATED DIFF  -     TSH 3RD GENERATION  -     URINALYSIS W/ RFLX MICROSCOPIC    2. Alcohol abuse, in remission- Continue current regimen of prescription and / or OTC medications , Call with recurrence     3.  Carpal tunnel syndrome of right wrist  -     REFERRAL TO ORTHOPEDIC SURGERY    4. Chronic foot pain, left  -     REFERRAL TO PODIATRY

## 2019-12-18 LAB
ALBUMIN SERPL-MCNC: 4.7 G/DL (ref 3.5–5.5)
ALBUMIN/GLOB SERPL: 2.2 {RATIO} (ref 1.2–2.2)
ALP SERPL-CCNC: 73 IU/L (ref 39–117)
ALT SERPL-CCNC: 18 IU/L (ref 0–44)
APPEARANCE UR: CLEAR
AST SERPL-CCNC: 22 IU/L (ref 0–40)
BASOPHILS # BLD AUTO: 0.1 X10E3/UL (ref 0–0.2)
BASOPHILS NFR BLD AUTO: 0 %
BILIRUB SERPL-MCNC: 0.4 MG/DL (ref 0–1.2)
BILIRUB UR QL STRIP: NEGATIVE
BUN SERPL-MCNC: 15 MG/DL (ref 6–24)
BUN/CREAT SERPL: 21 (ref 9–20)
CALCIUM SERPL-MCNC: 9.4 MG/DL (ref 8.7–10.2)
CHLORIDE SERPL-SCNC: 99 MMOL/L (ref 96–106)
CO2 SERPL-SCNC: 24 MMOL/L (ref 20–29)
COLOR UR: YELLOW
CREAT SERPL-MCNC: 0.73 MG/DL (ref 0.76–1.27)
EOSINOPHIL # BLD AUTO: 0.1 X10E3/UL (ref 0–0.4)
EOSINOPHIL NFR BLD AUTO: 1 %
ERYTHROCYTE [DISTWIDTH] IN BLOOD BY AUTOMATED COUNT: 12.1 % (ref 12.3–15.4)
GLOBULIN SER CALC-MCNC: 2.1 G/DL (ref 1.5–4.5)
GLUCOSE SERPL-MCNC: 102 MG/DL (ref 65–99)
GLUCOSE UR QL: NEGATIVE
HCT VFR BLD AUTO: 49 % (ref 37.5–51)
HGB BLD-MCNC: 17.2 G/DL (ref 13–17.7)
HGB UR QL STRIP: NEGATIVE
IMM GRANULOCYTES # BLD AUTO: 0 X10E3/UL (ref 0–0.1)
IMM GRANULOCYTES NFR BLD AUTO: 0 %
KETONES UR QL STRIP: NEGATIVE
LEUKOCYTE ESTERASE UR QL STRIP: NEGATIVE
LYMPHOCYTES # BLD AUTO: 2 X10E3/UL (ref 0.7–3.1)
LYMPHOCYTES NFR BLD AUTO: 18 %
MCH RBC QN AUTO: 32.3 PG (ref 26.6–33)
MCHC RBC AUTO-ENTMCNC: 35.1 G/DL (ref 31.5–35.7)
MCV RBC AUTO: 92 FL (ref 79–97)
MICRO URNS: NORMAL
MONOCYTES # BLD AUTO: 0.8 X10E3/UL (ref 0.1–0.9)
MONOCYTES NFR BLD AUTO: 7 %
NEUTROPHILS # BLD AUTO: 8.2 X10E3/UL (ref 1.4–7)
NEUTROPHILS NFR BLD AUTO: 74 %
NITRITE UR QL STRIP: NEGATIVE
PH UR STRIP: 5.5 [PH] (ref 5–7.5)
PLATELET # BLD AUTO: 249 X10E3/UL (ref 150–450)
POTASSIUM SERPL-SCNC: 4.5 MMOL/L (ref 3.5–5.2)
PROT SERPL-MCNC: 6.8 G/DL (ref 6–8.5)
PROT UR QL STRIP: NEGATIVE
RBC # BLD AUTO: 5.32 X10E6/UL (ref 4.14–5.8)
SODIUM SERPL-SCNC: 138 MMOL/L (ref 134–144)
SP GR UR: 1.03 (ref 1–1.03)
TSH SERPL DL<=0.005 MIU/L-ACNC: 1.69 UIU/ML (ref 0.45–4.5)
UROBILINOGEN UR STRIP-MCNC: 0.2 MG/DL (ref 0.2–1)
WBC # BLD AUTO: 11.2 X10E3/UL (ref 3.4–10.8)

## 2020-02-21 DIAGNOSIS — F10.11 ALCOHOL ABUSE, IN REMISSION: ICD-10-CM

## 2020-02-21 RX ORDER — GABAPENTIN 600 MG/1
TABLET ORAL
Qty: 90 TAB | Refills: 0 | Status: SHIPPED | OUTPATIENT
Start: 2020-02-21 | End: 2020-03-20

## 2020-04-13 ENCOUNTER — PATIENT MESSAGE (OUTPATIENT)
Dept: INTERNAL MEDICINE CLINIC | Age: 45
End: 2020-04-13

## 2020-04-13 ENCOUNTER — VIRTUAL VISIT (OUTPATIENT)
Dept: INTERNAL MEDICINE CLINIC | Age: 45
End: 2020-04-13

## 2020-04-13 DIAGNOSIS — I10 ESSENTIAL HYPERTENSION: ICD-10-CM

## 2020-04-13 DIAGNOSIS — M54.50 BILATERAL LOW BACK PAIN, UNSPECIFIED CHRONICITY, UNSPECIFIED WHETHER SCIATICA PRESENT: Primary | ICD-10-CM

## 2020-04-13 DIAGNOSIS — F51.04 PSYCHOPHYSIOLOGICAL INSOMNIA: ICD-10-CM

## 2020-04-13 DIAGNOSIS — G89.29 CHRONIC LOW BACK PAIN, UNSPECIFIED BACK PAIN LATERALITY, UNSPECIFIED WHETHER SCIATICA PRESENT: ICD-10-CM

## 2020-04-13 DIAGNOSIS — M54.50 CHRONIC LOW BACK PAIN, UNSPECIFIED BACK PAIN LATERALITY, UNSPECIFIED WHETHER SCIATICA PRESENT: ICD-10-CM

## 2020-04-13 RX ORDER — TRAZODONE HYDROCHLORIDE 100 MG/1
100 TABLET ORAL
Qty: 30 TAB | Refills: 1 | Status: ON HOLD | OUTPATIENT
Start: 2020-04-13 | End: 2020-09-09

## 2020-04-13 RX ORDER — METHYLPREDNISOLONE 4 MG/1
TABLET ORAL
Qty: 1 DOSE PACK | Refills: 0 | Status: SHIPPED | OUTPATIENT
Start: 2020-04-13 | End: 2020-08-21 | Stop reason: ALTCHOICE

## 2020-04-13 NOTE — PROGRESS NOTES
Gisela Rushing is a 38 y.o. male who was seen by synchronous (real-time) audio-video technology on 4/13/2020. He confirmed that, for purposes of billing, this is a virtual visit with his provider for which we will submit a claim for reimbursement to his insurance company. He is aware that he will be responsible for any copays, coinsurance amounts or other amounts not covered by his insurance company. Do you accept - YES    This visit was completed was completed virtually using [employer]      Assessment & Plan:   Diagnoses and all orders for this visit:    1. Bilateral low back pain, unspecified chronicity, unspecified whether sciatica present  -     methylPREDNISolone (MEDROL DOSEPACK) 4 mg tablet; As directed    2. Chronic low back pain, unspecified back pain laterality, unspecified whether sciatica present    3. Psychophysiological insomnia  -     traZODone (DESYREL) 100 mg tablet; Take 1 Tab by mouth nightly. TK 1 T PO QHS- new strength    4. Essential hypertension            Time-based coding, delete if not needed: I spent at least 15 minutes with this established patient, and >50% of the time was spent counseling and/or coordinating care regarding importance of proper body mechanics, proper use of medications,     Subjective:   Gisela Rushing was seen for Back Pain      37 yo male c/o low back pain. He has had chronic back pain for 15 years and takes Gabapentin 600 mg TID. 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 He reports he tries to be careful to bend his knees when lifting. His back pain is intermittent, across the low back, and at times radiates into the RLE and up the R back \"almost bringing me to me knees\". He has tried ice, heat, various OTC rubs and patches. He had a TENS unit in the past, but felt it to be of little help. He has also used a belt brace in the past - also not of much help.   He has been taking 400 mg Advil twice a day, but doesn't find this to be of much help. Pain is interfering with his sleep. He takes Trazodone intermittently, but is now out of it. He does not check his BP on regular basis. He is on 2 medications for his BP. Imp: Acute on Chronic Low Back Pain    Sleep disturbance - chronic   HTN - on meds - not checking BP    Plan: Medrol dospak (advised no Advil while on this)   Tylenol prn   Moist heat for 20 min per application   Stretching exercise (he reports he does these)   Will refill Trazodone   Letter to stay out of work for 3 days (patient request) - will mail to him   Check BP twice a month   Consider seeing Ortho for further eval    Prior to Admission medications    Medication Sig Start Date End Date Taking? Authorizing Provider   gabapentin (NEURONTIN) 600 mg tablet TAKE 1 TABLET BY MOUTH THREE TIMES DAILY 3/20/20  Yes Breana Riley MD   amLODIPine-benazepril (LOTREL) 5-20 mg per capsule TAKE 1 CAPSULE BY MOUTH ONCE DAILY  Indications: high blood pressure 12/16/19  Yes Breana Riley MD   metoprolol succinate (TOPROL-XL) 25 mg XL tablet TK 1 T PO NIGHTLY 12/16/19  Yes Breana Riley MD   acetaminophen (TYLENOL) 325 mg tablet Take  by mouth every four (4) hours as needed for Pain. Yes Provider, Historical   traZODone (DESYREL) 100 mg tablet TK 1 T PO QHS- new strength 11/28/18   Breana Riley MD       No Known Allergies    There is no problem list on file for this patient. Current Outpatient Medications   Medication Sig Dispense Refill    traZODone (DESYREL) 100 mg tablet Take 1 Tab by mouth nightly.  TK 1 T PO QHS- new strength 30 Tab 1    methylPREDNISolone (MEDROL DOSEPACK) 4 mg tablet As directed 1 Dose Pack 0    gabapentin (NEURONTIN) 600 mg tablet TAKE 1 TABLET BY MOUTH THREE TIMES DAILY 90 Tab 3    amLODIPine-benazepril (LOTREL) 5-20 mg per capsule TAKE 1 CAPSULE BY MOUTH ONCE DAILY  Indications: high blood pressure 30 Cap 3    metoprolol succinate (TOPROL-XL) 25 mg XL tablet TK 1 T PO NIGHTLY 30 Tab 3    acetaminophen (TYLENOL) 325 mg tablet Take  by mouth every four (4) hours as needed for Pain. No Known Allergies       ROS - per HPI      Objective:     General: alert, cooperative, mild distress   Mental  status: Anxious   Eyes:    Mouth:    Neck:    Resp:    Neuro:    Musculoskeletal:    Skin:    Psychiatric: anxious         Due to this being a TeleHealth evaluation, many elements of the physical examination are unable to be assessed. Pursuant to the emergency declaration under the [address] [place] [address] waiver authority and the [employer] Act, this Virtual  Visit was conducted, with patient's consent, to reduce the patient's risk of exposure to COVID-19 and provide continuity of care for an established patient. Services were provided through a video synchronous discussion virtually to substitute for in-person clinic visit. We discussed the expected course, resolution and complications of the diagnosis(es) in detail. Medication risks, benefits, costs, interactions, and alternatives were discussed as indicated. I advised him to contact the office if his condition worsens, changes or fails to improve as anticipated. He expressed understanding with the diagnosis(es) and plan.      Candi Jean NP

## 2020-04-13 NOTE — LETTER
NOTIFICATION RETURN TO WORK / SCHOOL 
 
4/13/2020 2:41 PM 
 
 
Mr. Verona Anthony [facility] [address] To Whom It May Concern: 
 
 
Verona Anthony is currently under the care of [facility]. He will return to work on: Thursday, April 16, 2020. If there are questions or concerns please have the patient contact our office. Sincerely, Candi Jean NP

## 2020-08-15 DIAGNOSIS — I10 ESSENTIAL HYPERTENSION: ICD-10-CM

## 2020-08-15 DIAGNOSIS — I10 ACCELERATED HYPERTENSION: ICD-10-CM

## 2020-08-15 RX ORDER — AMLODIPINE AND BENAZEPRIL HYDROCHLORIDE 5; 20 MG/1; MG/1
CAPSULE ORAL
Qty: 30 CAP | Refills: 0 | Status: SHIPPED | OUTPATIENT
Start: 2020-08-15 | End: 2020-09-19

## 2020-08-21 ENCOUNTER — OFFICE VISIT (OUTPATIENT)
Dept: URGENT CARE | Age: 45
End: 2020-08-21
Payer: COMMERCIAL

## 2020-08-21 VITALS — OXYGEN SATURATION: 98 % | TEMPERATURE: 100.1 F | RESPIRATION RATE: 17 BRPM | HEART RATE: 88 BPM

## 2020-08-21 DIAGNOSIS — Z11.59 SCREENING FOR VIRAL DISEASE: Primary | ICD-10-CM

## 2020-08-21 PROCEDURE — 99203 OFFICE O/P NEW LOW 30 MIN: CPT | Performed by: NURSE PRACTITIONER

## 2020-08-21 NOTE — PROGRESS NOTES
The history is provided by the patient. Patient presented to [facility] for COVID testing. Patient complains of subjective fever, chills, muscle aches, runny nose and headache. Patient denies COVID exposure or SOB. Past Medical History:   Diagnosis Date    Alcohol abuse     Alcoholic hepatitis     Anxiety     Atrial fibrillation ([address]     Dental injury 10/2018    Hypertension     Ill-defined condition     a-fib    Renal failure         History reviewed. No pertinent surgical history.       Family History   Problem Relation Age of Onset   Noreene Peat Breast Cancer Mother     Hypertension Father     Hypertension Brother     Heart Disease Maternal Grandfather     Alcohol abuse Maternal Grandmother     Alcohol abuse Paternal Grandfather         Social History     Socioeconomic History    Marital status:      Spouse name: Not on file    Number of children: Not on file    Years of education: Not on file    Highest education level: Not on file   Occupational History    Not on file   Social Needs    Financial resource strain: Not on file    Food insecurity     Worry: Not on file     Inability: Not on file    Transportation needs     Medical: Not on file     Non-medical: Not on file   Tobacco Use    Smoking status: Current Every Day Smoker     Packs/day: 0.50    Smokeless tobacco: Never Used   Substance and Sexual Activity    Alcohol use: Yes     Frequency: Never     Comment: has not had a drink since 7/16/2018 - rehab    Drug use: No     Types: Marijuana     Comment: former     Sexual activity: Not on file   Lifestyle    Physical activity     Days per week: Not on file     Minutes per session: Not on file    Stress: Not on file   Relationships    Social connections     Talks on phone: Not on file     Gets together: Not on file     Attends Restoration service: Not on file     Active member of club or organization: Not on file     Attends meetings of clubs or organizations: Not on file Relationship status: Not on file    Intimate partner violence     Fear of current or ex partner: Not on file     Emotionally abused: Not on file     Physically abused: Not on file     Forced sexual activity: Not on file   Other Topics Concern    Not on file   Social History Narrative    Not on file                ALLERGIES: Patient has no known allergies. Review of Systems   Constitutional: Positive for fever. HENT: Positive for rhinorrhea. Respiratory: Negative. Cardiovascular: Negative. Gastrointestinal: Negative. Musculoskeletal: Positive for myalgias. Neurological: Positive for headaches. Vitals:    08/21/20 0950   Pulse: 88   Resp: 17   Temp: 100.1 °F (37.8 °C)   SpO2: 98%       Physical Exam  Constitutional:       General: He is not in acute distress. Appearance: Normal appearance. He is not ill-appearing. HENT:      Nose: Congestion present. No rhinorrhea. Mouth/Throat:      Pharynx: No oropharyngeal exudate or posterior oropharyngeal erythema. Cardiovascular:      Rate and Rhythm: Normal rate. Pulmonary:      Effort: Pulmonary effort is normal.      Breath sounds: Normal breath sounds. No wheezing or rhonchi. Neurological:      Mental Status: He is alert and oriented to person, place, and time. Psychiatric:         Mood and Affect: Mood normal.         MDM     Differential Diagnosis; Clinical Impression; Plan:     (Z11.59) Screening for viral disease  (primary encounter diagnosis)  No orders of the defined types were placed in this encounter. Tested patient for COVID-19. Patient given education material.  The condition was discussed with the patient and they understand. If symptoms worsen the pt is to go to the ER. Advised patient to take tylenol for discomfort. Advised to quarantine self.       This patient was seen in [facility] Clinic at [address] [place] [address] Urgent Care while in their vehicle due to COVID-19 pandemic with PPE and focused examination in order to decrease community viral transmission. The patient/guardian gave verbal consent to treat.           Procedures

## 2020-08-23 LAB — SARS-COV-2, NAA: NOT DETECTED

## 2020-09-08 ENCOUNTER — HOSPITAL ENCOUNTER (INPATIENT)
Age: 45
LOS: 4 days | Discharge: HOME | DRG: 897 | End: 2020-09-12
Attending: EMERGENCY MEDICINE | Admitting: PSYCHIATRY & NEUROLOGY
Payer: COMMERCIAL

## 2020-09-08 DIAGNOSIS — F10.10 ALCOHOL ABUSE: ICD-10-CM

## 2020-09-08 DIAGNOSIS — F10.11 ALCOHOL ABUSE, IN REMISSION: ICD-10-CM

## 2020-09-08 DIAGNOSIS — R45.851 SUICIDAL IDEATION: Primary | ICD-10-CM

## 2020-09-08 PROBLEM — F31.9 BIPOLAR 1 DISORDER (HCC): Status: ACTIVE | Noted: 2020-09-08

## 2020-09-08 LAB
ALBUMIN SERPL-MCNC: 4.2 G/DL (ref 3.5–5)
ALBUMIN/GLOB SERPL: 1.1 {RATIO} (ref 1.1–2.2)
ALP SERPL-CCNC: 82 U/L (ref 45–117)
ALT SERPL-CCNC: 28 U/L (ref 12–78)
AMPHET UR QL SCN: NEGATIVE
ANION GAP SERPL CALC-SCNC: 3 MMOL/L (ref 5–15)
APAP SERPL-MCNC: <2 UG/ML (ref 10–30)
AST SERPL-CCNC: 23 U/L (ref 15–37)
BARBITURATES UR QL SCN: NEGATIVE
BASOPHILS # BLD: 0.1 K/UL (ref 0–0.1)
BASOPHILS NFR BLD: 1 % (ref 0–1)
BENZODIAZ UR QL: NEGATIVE
BILIRUB SERPL-MCNC: 0.3 MG/DL (ref 0.2–1)
BUN SERPL-MCNC: 10 MG/DL (ref 6–20)
BUN/CREAT SERPL: 13 (ref 12–20)
CALCIUM SERPL-MCNC: 9.4 MG/DL (ref 8.5–10.1)
CANNABINOIDS UR QL SCN: NEGATIVE
CHLORIDE SERPL-SCNC: 108 MMOL/L (ref 97–108)
CO2 SERPL-SCNC: 31 MMOL/L (ref 21–32)
COCAINE UR QL SCN: NEGATIVE
COMMENT, HOLDF: NORMAL
COVID-19 RAPID TEST, COVR: NOT DETECTED
CREAT SERPL-MCNC: 0.8 MG/DL (ref 0.7–1.3)
DIFFERENTIAL METHOD BLD: ABNORMAL
DRUG SCRN COMMENT,DRGCM: NORMAL
EOSINOPHIL # BLD: 0.1 K/UL (ref 0–0.4)
EOSINOPHIL NFR BLD: 1 % (ref 0–7)
ERYTHROCYTE [DISTWIDTH] IN BLOOD BY AUTOMATED COUNT: 12.6 % (ref 11.5–14.5)
ETHANOL SERPL-MCNC: 243 MG/DL
GLOBULIN SER CALC-MCNC: 3.9 G/DL (ref 2–4)
GLUCOSE SERPL-MCNC: 84 MG/DL (ref 65–100)
HCT VFR BLD AUTO: 50.6 % (ref 36.6–50.3)
HEALTH STATUS, XMCV2T: NORMAL
HGB BLD-MCNC: 17.4 G/DL (ref 12.1–17)
IMM GRANULOCYTES # BLD AUTO: 0 K/UL (ref 0–0.04)
IMM GRANULOCYTES NFR BLD AUTO: 0 % (ref 0–0.5)
LYMPHOCYTES # BLD: 2.8 K/UL (ref 0.8–3.5)
LYMPHOCYTES NFR BLD: 33 % (ref 12–49)
MCH RBC QN AUTO: 33.4 PG (ref 26–34)
MCHC RBC AUTO-ENTMCNC: 34.4 G/DL (ref 30–36.5)
MCV RBC AUTO: 97.1 FL (ref 80–99)
METHADONE UR QL: NEGATIVE
MONOCYTES # BLD: 0.5 K/UL (ref 0–1)
MONOCYTES NFR BLD: 6 % (ref 5–13)
NEUTS SEG # BLD: 5.1 K/UL (ref 1.8–8)
NEUTS SEG NFR BLD: 59 % (ref 32–75)
NRBC # BLD: 0 K/UL (ref 0–0.01)
NRBC BLD-RTO: 0 PER 100 WBC
OPIATES UR QL: NEGATIVE
PCP UR QL: NEGATIVE
PLATELET # BLD AUTO: 267 K/UL (ref 150–400)
PMV BLD AUTO: 8.8 FL (ref 8.9–12.9)
POTASSIUM SERPL-SCNC: 3.6 MMOL/L (ref 3.5–5.1)
PROT SERPL-MCNC: 8.1 G/DL (ref 6.4–8.2)
RBC # BLD AUTO: 5.21 M/UL (ref 4.1–5.7)
SALICYLATES SERPL-MCNC: 3.2 MG/DL (ref 2.8–20)
SAMPLES BEING HELD,HOLD: NORMAL
SODIUM SERPL-SCNC: 142 MMOL/L (ref 136–145)
SOURCE, COVRS: NORMAL
SPECIMEN SOURCE, FCOV2M: NORMAL
SPECIMEN TYPE, XMCV1T: NORMAL
UR CULT HOLD, URHOLD: NORMAL
WBC # BLD AUTO: 8.5 K/UL (ref 4.1–11.1)

## 2020-09-08 PROCEDURE — 90791 PSYCH DIAGNOSTIC EVALUATION: CPT

## 2020-09-08 PROCEDURE — 80053 COMPREHEN METABOLIC PANEL: CPT

## 2020-09-08 PROCEDURE — 99283 EMERGENCY DEPT VISIT LOW MDM: CPT

## 2020-09-08 PROCEDURE — 65220000003 HC RM SEMIPRIVATE PSYCH

## 2020-09-08 PROCEDURE — 80307 DRUG TEST PRSMV CHEM ANLYZR: CPT

## 2020-09-08 PROCEDURE — 87635 SARS-COV-2 COVID-19 AMP PRB: CPT

## 2020-09-08 PROCEDURE — 36415 COLL VENOUS BLD VENIPUNCTURE: CPT

## 2020-09-08 PROCEDURE — 85025 COMPLETE CBC W/AUTO DIFF WBC: CPT

## 2020-09-08 PROCEDURE — 74011250637 HC RX REV CODE- 250/637: Performed by: NURSE PRACTITIONER

## 2020-09-08 RX ORDER — LORAZEPAM 2 MG/ML
1 INJECTION INTRAMUSCULAR
Status: DISCONTINUED | OUTPATIENT
Start: 2020-09-08 | End: 2020-09-12 | Stop reason: HOSPADM

## 2020-09-08 RX ORDER — PHENOBARBITAL 64.8 MG/1
64.8 TABLET ORAL
Status: ACTIVE | OUTPATIENT
Start: 2020-09-08 | End: 2020-09-09

## 2020-09-08 RX ORDER — PHENOBARBITAL 32.4 MG/1
32.4 TABLET ORAL 2 TIMES DAILY
Status: COMPLETED | OUTPATIENT
Start: 2020-09-11 | End: 2020-09-11

## 2020-09-08 RX ORDER — DIPHENHYDRAMINE HYDROCHLORIDE 50 MG/ML
INJECTION, SOLUTION INTRAMUSCULAR; INTRAVENOUS
Status: DISPENSED
Start: 2020-09-08 | End: 2020-09-09

## 2020-09-08 RX ORDER — HALOPERIDOL 5 MG/ML
INJECTION INTRAMUSCULAR
Status: DISPENSED
Start: 2020-09-08 | End: 2020-09-09

## 2020-09-08 RX ORDER — PHENOBARBITAL 64.8 MG/1
64.8 TABLET ORAL 4 TIMES DAILY
Status: COMPLETED | OUTPATIENT
Start: 2020-09-08 | End: 2020-09-09

## 2020-09-08 RX ORDER — DIPHENHYDRAMINE HYDROCHLORIDE 50 MG/ML
50 INJECTION, SOLUTION INTRAMUSCULAR; INTRAVENOUS
Status: DISCONTINUED | OUTPATIENT
Start: 2020-09-08 | End: 2020-09-12 | Stop reason: HOSPADM

## 2020-09-08 RX ORDER — ACETAMINOPHEN 325 MG/1
650 TABLET ORAL
Status: DISCONTINUED | OUTPATIENT
Start: 2020-09-08 | End: 2020-09-12 | Stop reason: HOSPADM

## 2020-09-08 RX ORDER — ADHESIVE BANDAGE
30 BANDAGE TOPICAL DAILY PRN
Status: DISCONTINUED | OUTPATIENT
Start: 2020-09-08 | End: 2020-09-12 | Stop reason: HOSPADM

## 2020-09-08 RX ORDER — PHENOBARBITAL 32.4 MG/1
16.2 TABLET ORAL
Status: DISCONTINUED | OUTPATIENT
Start: 2020-09-11 | End: 2020-09-12 | Stop reason: HOSPADM

## 2020-09-08 RX ORDER — PHENOBARBITAL 32.4 MG/1
32.4 TABLET ORAL 4 TIMES DAILY
Status: COMPLETED | OUTPATIENT
Start: 2020-09-09 | End: 2020-09-10

## 2020-09-08 RX ORDER — PHENOBARBITAL 32.4 MG/1
16.2 TABLET ORAL 2 TIMES DAILY
Status: DISCONTINUED | OUTPATIENT
Start: 2020-09-12 | End: 2020-09-12 | Stop reason: HOSPADM

## 2020-09-08 RX ORDER — LANOLIN ALCOHOL/MO/W.PET/CERES
100 CREAM (GRAM) TOPICAL DAILY
Status: DISCONTINUED | OUTPATIENT
Start: 2020-09-09 | End: 2020-09-12 | Stop reason: HOSPADM

## 2020-09-08 RX ORDER — FOLIC ACID 1 MG/1
1 TABLET ORAL DAILY
Status: DISCONTINUED | OUTPATIENT
Start: 2020-09-09 | End: 2020-09-12 | Stop reason: HOSPADM

## 2020-09-08 RX ORDER — BENZTROPINE MESYLATE 1 MG/1
1 TABLET ORAL
Status: DISCONTINUED | OUTPATIENT
Start: 2020-09-08 | End: 2020-09-12 | Stop reason: HOSPADM

## 2020-09-08 RX ORDER — LORAZEPAM 2 MG/ML
INJECTION INTRAMUSCULAR
Status: DISPENSED
Start: 2020-09-08 | End: 2020-09-09

## 2020-09-08 RX ORDER — OLANZAPINE 5 MG/1
5 TABLET ORAL
Status: DISCONTINUED | OUTPATIENT
Start: 2020-09-08 | End: 2020-09-12 | Stop reason: HOSPADM

## 2020-09-08 RX ORDER — PHENOBARBITAL 32.4 MG/1
32.4 TABLET ORAL
Status: DISPENSED | OUTPATIENT
Start: 2020-09-09 | End: 2020-09-11

## 2020-09-08 RX ORDER — CLONIDINE HYDROCHLORIDE 0.1 MG/1
0.1 TABLET ORAL
Status: DISCONTINUED | OUTPATIENT
Start: 2020-09-08 | End: 2020-09-12 | Stop reason: HOSPADM

## 2020-09-08 RX ORDER — TRAZODONE HYDROCHLORIDE 50 MG/1
50 TABLET ORAL
Status: DISCONTINUED | OUTPATIENT
Start: 2020-09-08 | End: 2020-09-12 | Stop reason: HOSPADM

## 2020-09-08 RX ORDER — HALOPERIDOL 5 MG/ML
5 INJECTION INTRAMUSCULAR
Status: DISCONTINUED | OUTPATIENT
Start: 2020-09-08 | End: 2020-09-12 | Stop reason: HOSPADM

## 2020-09-08 RX ORDER — IBUPROFEN 400 MG/1
400 TABLET ORAL
Status: DISCONTINUED | OUTPATIENT
Start: 2020-09-08 | End: 2020-09-12 | Stop reason: HOSPADM

## 2020-09-08 RX ORDER — PROCHLORPERAZINE MALEATE 10 MG
10 TABLET ORAL
Status: DISCONTINUED | OUTPATIENT
Start: 2020-09-08 | End: 2020-09-12 | Stop reason: HOSPADM

## 2020-09-08 RX ORDER — HYDROXYZINE 50 MG/1
50 TABLET, FILM COATED ORAL
Status: DISCONTINUED | OUTPATIENT
Start: 2020-09-08 | End: 2020-09-12 | Stop reason: HOSPADM

## 2020-09-08 RX ADMIN — TRAZODONE HYDROCHLORIDE 50 MG: 50 TABLET ORAL at 21:53

## 2020-09-08 RX ADMIN — PHENOBARBITAL 64.8 MG: 64.8 TABLET ORAL at 21:53

## 2020-09-08 NOTE — ED PROVIDER NOTES
HPI the patient complains of depression and suicidal ideation. He has been drinking excessively in the past several days. He denies vomiting, abdominal pain or other systemic symptoms. Past Medical History:   Diagnosis Date    Alcohol abuse     Alcoholic hepatitis     Anxiety     Atrial fibrillation ([address]     Dental injury 10/2018    Hypertension     Ill-defined condition     a-fib    Renal failure        History reviewed. No pertinent surgical history.       Family History:   Problem Relation Age of Onset   Mary Kristie Breast Cancer Mother     Hypertension Father     Hypertension Brother     Heart Disease Maternal Grandfather     Alcohol abuse Maternal Grandmother     Alcohol abuse Paternal Grandfather        Social History     Socioeconomic History    Marital status:      Spouse name: Not on file    Number of children: Not on file    Years of education: Not on file    Highest education level: Not on file   Occupational History    Not on file   Social Needs    Financial resource strain: Not on file    Food insecurity     Worry: Not on file     Inability: Not on file    Transportation needs     Medical: Not on file     Non-medical: Not on file   Tobacco Use    Smoking status: Current Every Day Smoker     Packs/day: 0.50    Smokeless tobacco: Never Used   Substance and Sexual Activity    Alcohol use: Yes     Frequency: Never     Comment: has not had a drink since 7/16/2018 - rehab    Drug use: No     Types: Marijuana     Comment: former     Sexual activity: Not on file   Lifestyle    Physical activity     Days per week: Not on file     Minutes per session: Not on file    Stress: Not on file   Relationships    Social connections     Talks on phone: Not on file     Gets together: Not on file     Attends Quaker service: Not on file     Active member of club or organization: Not on file     Attends meetings of clubs or organizations: Not on file     Relationship status: Not on file   Mary Kristie Intimate partner violence     Fear of current or ex partner: Not on file     Emotionally abused: Not on file     Physically abused: Not on file     Forced sexual activity: Not on file   Other Topics Concern    Not on file   Social History Narrative    Not on file         ALLERGIES: Patient has no known allergies. Review of Systems   Constitutional: Negative for fever. HENT: Negative for voice change. Eyes: Negative for pain. Respiratory: Negative for cough and shortness of breath. Cardiovascular: Negative for chest pain. Gastrointestinal: Negative for abdominal pain, nausea and vomiting. Genitourinary: Negative for flank pain. Musculoskeletal: Negative for back pain. Skin: Negative for rash. Neurological: Negative for headaches. Psychiatric/Behavioral: Positive for suicidal ideas. Negative for confusion. Vitals:    09/08/20 1310   BP: 107/68   Pulse: 76   Resp: 18   Temp: 98.2 °F (36.8 °C)   SpO2: 96%   Weight: 77.8 kg (171 lb 8.3 oz)            Physical Exam  Constitutional:       General: He is not in acute distress. Appearance: He is well-developed. HENT:      Head: Normocephalic. Eyes:      Pupils: Pupils are equal, round, and reactive to light. Neck:      Musculoskeletal: Normal range of motion. Cardiovascular:      Rate and Rhythm: Normal rate. Heart sounds: No murmur. Pulmonary:      Effort: Pulmonary effort is normal.      Breath sounds: Normal breath sounds. Abdominal:      Palpations: Abdomen is soft. Tenderness: There is no abdominal tenderness. Musculoskeletal: Normal range of motion. Skin:     General: Skin is warm and dry. Capillary Refill: Capillary refill takes less than 2 seconds. Neurological:      Mental Status: He is alert.    Psychiatric:         Behavior: Behavior normal.          MDM       Procedures

## 2020-09-08 NOTE — BSMART NOTE
Comprehensive Assessment Form Part 1 Section I - Disposition Axis I - Substance Induced Mood Disorder/Depression, Alcohol Use Disorder Axis II - Deferred Axis III - Past Medical History:  
Diagnosis Date  Alcohol abuse  Alcoholic hepatitis  Anxiety  Atrial fibrillation ([place])  Dental injury 10/2018  Hypertension  Ill-defined condition   
 a-fib  Renal failure The Medical Doctor to Psychiatrist conference was not completed. The Medical Doctor is in agreement with Psychiatrist disposition because of (reason) Admission is recommended. The plan is pending medical clearance patient will be presented for admission to [facility]. The on-call Psychiatrist consulted was Dr. Bonny Sanchez. The admitting Psychiatrist will be Dr. Debbie Price. The admitting Diagnosis is Substance Induced Mood Disorder, Alcohol Use Disorder. The Payor source is [facility]. 4 days authorized [id number] review 9-11-20. Assigned to [id number] [place] [address] Team [phone]. Section II - Integrated Summary Summary:  Patient came in accompanied by wife for mental health evaluation. Patient stated \"I am over it. \"  When asked to be more specific he stated \"I'm just done. \"  Patient indicated \"everything\" was stressful to include finances, work, and relationships. Patient reported he has been drinking for 30 years and needs to stop. Per wife, he has been drinking \"nonstop\" for last 24 hours. Patient has been in an [employer] in 2018 and was sober for about 3 months after that. No current supports in place. Patient reported he is on blood pressure medication and Gabapentin that is \"supposed to help with drinking. \"   When asked if he is involved in AA,  he stated \"not really. \" 
 
Patient is alert, oriented, and calm. Mood is depressed and patient reported loss of about 50 lbs in 6 months which he attributes to physical work and the heat.   Patient reported \"I sleep\" but indicated it is not restful sleep. Patient denied any hallucinations and there is no evidence of any. Patient reported suicidal ideation that has been ongoing for last couple of weeks. Patient denied a plan or prior attempts. Wife interjected that he has overdosed on Gabapentin about 1-2 months ago while intoxicated. Patient remembered this when prompted but not initially. Patient denied any homicidal ideation or history of aggression. The patienthas demonstrated mental capacity to provide informed consent. The information is given by the patient, spouse/SO and past medical records. The Chief Complaint is suicidal ideation. The Precipitant Factors are SA affecting finances, work, and relationship. Previous Hospitalizations: NA The patient has not previously been in restraints. Current Psychiatrist and/or  is NA. Lethality Assessment: 
 
The potential for suicide noted by the following: ideation and current substance abuse . Hx of overdose 1-2 months ago. The potential for homicide is not noted. The patient has not been a perpetrator of sexual or physical abuse. There are not pending charges. The patient is felt to be at risk for self harm or harm to others. The attending nurse was advised that security has not been notified. Section III - Psychosocial 
The patient's overall mood and attitude is depressed. Feelings of helplessness and hopelessness are observed by verbal statements. Generalized anxiety is not observed. Panic is not observed. Phobias are not observed. Obsessive compulsive tendencies are not observed. Section IV - Mental Status Exam 
The patient's appearance shows no evidence of impairment. The patient's behavior shows no evidence of impairment. The patient is oriented to time, place, person and situation. The patient's speech shows no evidence of impairment. The patient's mood is depressed.   The range of affect is constricted. The patient's thought content demonstrates no evidence of impairment. The thought process shows no evidence of impairment. The patient's perception shows no evidence of impairment. The patient's memory shows no evidence of impairment. The patient's appetite is decreased and shows signs of weight loss. The patient's sleep shows no evidence of impairment. Patient reported he sleeps but does not feel rested. The patient shows no insight. The patient's judgement is psychologically impaired. Section V - Substance Abuse The patient is using substances. The patient is using alcohol for greater than 10 years with last use on today. The patient has experienced the following withdrawal symptoms: N/A. Patient reported drinking for 30 years. Patient acknowledged occasional marijuana use but not regular use. Section VI - Living Arrangements The patient is . The patient lives with a spouse. The patient has 3 children, one biological and two stepchildren. The patient does plan to return home upon discharge. The patient does not have legal issues pending. The patient's source of income comes from employment. Christianity and cultural practices have not been voiced at this time. The patient's greatest support comes from wife, parents, and brother and this person will be involved with the treatment. The patient has not been in an event described as horrible or outside the realm of ordinary life experience either currently or in the past. 
The patient has not been a victim of sexual/physical abuse. Section VII - Other Areas of Clinical Concern The highest grade achieved is NA with the overall quality of school experience being described as NA. The patient is currently employed and speaks [place] as a primary language. The patient has no communication impairments affecting communication.  The patient's preference for learning can be described as: can read and write adequately. The patient's hearing is normal.  The patient's vision is impaired and  wears glasses or contacts.  
 
 
Tori Pires LPC

## 2020-09-08 NOTE — ED TRIAGE NOTES
Pt c/o SI. Pt reports chronic depression with hx of SI. Denies HI. Per wife, pt has been drinking excessively. Pt reports last drink at 0700 this morning. Pt states, \"I'm done! \"

## 2020-09-08 NOTE — BH NOTES
1845 after minutes of arriving on unit pt is demanding to leave. Pt is loudly stating \" I'M NOT STAYING HERE! I DIDN'T SIGN UP FOR THIS! Pt states he was told that he would be provided with medication to help with depression and he could return back home. As writer attempts to explain options at this point, pt becomes increasingly angry and irritable while demanding this writer to get on the phone to make calls asap. Again writer re attempts to explain process but pt becomes argumentative with increased anger. Writer asks pt to remain seated near staff until appropriate phone calls can be made. [address] NP Lj Olivia. Given orders to have pt assessed for TDO    1856 writer contacted [address] . Asked to email over facesheet along with dr stone. 1910 pt on phone with [address] pt off phone with THE [facility] and still is adamant about leaving after being told by staff numerous times that he would be assessed. Pt doesn't seem to be processing information. Pt angrily states to this writer \" I'm leaving off this unit one way or another! I don't need anyone permission to leave! 1940 security is present for support. Pt is escorted to acute unit for TDO assessment. 2000 pt is being assessed by THE [facility] via internet. 2015 pt is agreeing to stay over night and requesting medications for sleep.

## 2020-09-08 NOTE — ROUTINE PROCESS
TRANSFER - OUT REPORT: 
 
Verbal report given to Saeid Mauro (name) on Merline Sport  being transferred to Room 746 (unit) for routine progression of care Report consisted of patients Situation, Background, Assessment and  
Recommendations(SBAR). Information from the following report(s) SBAR, Kardex, ED Summary, [facility] and Recent Results was reviewed with the receiving nurse. Lines:    
 
Opportunity for questions and clarification was provided. Patient transported with: 
RN and HPD

## 2020-09-08 NOTE — BSMART NOTE
Patient has been accepted to [facility] Bed 746-01 pending negative COVID test.  Marcin Chaudhari NP accepted for Dr Livan Carr. Call  for report.

## 2020-09-08 NOTE — BH NOTES
TRANSFER - IN REPORT:    Verbal report received from Shayy arciniega Velma Harrison  being received from ED  for routine progression of care      Report consisted of patients Situation, Background, Assessment and   Recommendations(SBAR). Information from the following report(s) SBAR was reviewed with the receiving nurse. Opportunity for questions and clarification was provided. Assessment completed upon patients arrival to unit and care assumed.

## 2020-09-09 LAB
COVID-19, XGCOVT: NOT DETECTED
HEALTH STATUS, XMCV2T: NORMAL
SPECIMEN TYPE, XMCV1T: NORMAL

## 2020-09-09 PROCEDURE — 74011250637 HC RX REV CODE- 250/637: Performed by: PSYCHIATRY & NEUROLOGY

## 2020-09-09 PROCEDURE — 74011250637 HC RX REV CODE- 250/637: Performed by: NURSE PRACTITIONER

## 2020-09-09 PROCEDURE — 65220000003 HC RM SEMIPRIVATE PSYCH

## 2020-09-09 RX ORDER — IBUPROFEN 200 MG
1 TABLET ORAL DAILY
Status: DISCONTINUED | OUTPATIENT
Start: 2020-09-09 | End: 2020-09-10

## 2020-09-09 RX ORDER — IBUPROFEN 200 MG
TABLET ORAL
Status: DISPENSED
Start: 2020-09-09 | End: 2020-09-10

## 2020-09-09 RX ADMIN — PHENOBARBITAL 64.8 MG: 64.8 TABLET ORAL at 17:14

## 2020-09-09 RX ADMIN — PHENOBARBITAL 64.8 MG: 64.8 TABLET ORAL at 08:41

## 2020-09-09 RX ADMIN — PHENOBARBITAL 32.4 MG: 32.4 TABLET ORAL at 21:52

## 2020-09-09 RX ADMIN — FOLIC ACID 1 MG: 1 TABLET ORAL at 08:40

## 2020-09-09 RX ADMIN — PHENOBARBITAL 64.8 MG: 64.8 TABLET ORAL at 12:52

## 2020-09-09 RX ADMIN — TRAZODONE HYDROCHLORIDE 50 MG: 50 TABLET ORAL at 21:52

## 2020-09-09 RX ADMIN — Medication 100 MG: at 08:40

## 2020-09-09 RX ADMIN — CLONIDINE HYDROCHLORIDE 0.1 MG: 0.1 TABLET ORAL at 08:42

## 2020-09-09 RX ADMIN — MULTIPLE VITAMINS W/ MINERALS TAB 1 TABLET: TAB at 08:40

## 2020-09-09 NOTE — PROGRESS NOTES
Problem: Alcohol Withdrawal  Goal: *STG: Seeks staff when symptoms of withdrawal increase  Outcome: Not Progressing Towards Goal  Goal: *STG: Attends activities and groups  Outcome: Not Progressing Towards Goal  Goal: Interventions  Outcome: Progressing Towards Goal

## 2020-09-09 NOTE — PROGRESS NOTES
Pt receiving continuous q15m safety checks, pt currently asleep resting comfortably in bed. No distress noted, respiratory WNL. Supplemental lighting utilized.     Problem: Alcohol Withdrawal  Goal: *STG: Remains safe in hospital  Outcome: Progressing Towards Goal     Problem: Patient Education: Go to Patient Education Activity  Goal: Patient/Family Education  Outcome: Progressing Towards Goal

## 2020-09-09 NOTE — BH NOTES
PSYCHOSOCIAL ASSESSMENT  :Patient identifying info: Lidya Nix is a 38 y.o., male admitted 9/8/2020  3:41 PM     Presenting problem and precipitating factors: Pt was voluntarily addmitted [facility] for worsening EtOH use from depression and anxiety. He wants help quitting EtOH but does not want to be on BHU. Pt is asking to leave, his patient rights were explained as well as [place] crisis assessment process. He agreed to be assessed and wants to leave. Mental status assessment: alert, oriented, depressed, tearful, anxious, fidgeting    Strengths: stable housing, stable income, supportive family, voluntarily seeking treatment    Collateral information: wife, Jodi Lawrence ([phone]) wife spoke with MSW and expressed concern that patient has a pattern of drinking to the point of intoxication, making suicide attempts and then recanting the next day. Pt attempted to OD on pills 2 months ago while intoxicated then made himself through up. Wife is concerned without treatment this cycle will continue and excalate to a lethal suicide attempt. Current psychiatric /substance abuse providers and contact info: to be linked    Previous psychiatric/substance abuse [facility]    Family history of mental illness or substance abuse:  None indicated    Substance abuse history:    Social History     Tobacco Use    Smoking status: Current Every Day Smoker     Packs/day: 0.50    Smokeless tobacco: Never Used   Substance Use Topics    Alcohol use: Yes     Frequency: Never     Comment: has not had a drink since 7/16/2018 - rehab       History of biomedical complications associated with substance abuse : none indicated    Patient's current acceptance of treatment or motivation for change: voluntary but asking to leave and will be assessed by [facility] for TDO    Family constellation: wife, 1 kids, 2 step kids    Is significant other involved?  Yes  17 years    Describe support system: wife    Describe living arrangements and home environment: lives with wife    Health issues:   Hospital Problems  Date Reviewed: 2019          Codes Class Noted POA    Bipolar 1 disorder ([facility] ICD-10-CM: F31.9  ICD-9-CM: 296.7  2020 Unknown              Trauma history: none indicated    Legal issues: none indicated    History of  service: no    Financial status: [place]    Mandaeism/cultural factors: Jewish     Education/work history: some colege    Have you been licensed as a health care professional (current or ): no     Leisure and recreation preferences: not assessed    Describe coping skills: limited, ineffective, self medicating with EtOH.     Skylar Smith  2020

## 2020-09-09 NOTE — INTERDISCIPLINARY ROUNDS
Behavioral Health Interdisciplinary Rounds Patient Name: Dayne De Los Santos  Age: 38 y.o. Room/Bed:  746/ Primary Diagnosis: <principal problem not specified> Admission Status: Voluntary Readmission within 30 days: no 
Power of  in place: no 
Patient requires a blocked bed: no          Reason for blocked bed: VTE Prophylaxis:  
 
Mobility needs/Fall risk:  
Flu Vaccine :   
Nutritional Plan:  
Consults:         
Labs/Testing due today?: no 
 
Sleep hours: 5 Participation in Care/Groups:  no 
Medication Compliant?:  
PRNS (last 24 hours): Sleep Aid Restraints (last 24 hours):  no 
  
CIWA (range last 24 hours): CIWA-Ar Total: 0  
COWS (range last 24 hours): Alcohol screening (AUDIT) completed -   AUDIT Score: 27 If applicable, date SBIRT discussed in treatment team AND documented:  
AUDIT Screen Score: AUDIT Score: 27 Document Brief Intervention (corresponds directly with the 5 A's, Ask, Advise, Assess, Assist, and Arrange): At- Risk Patients (Score 7-15 for women; 8-15 for men) Discuss concern patient is drinking at unhealthy levels known to increase risk of alcohol-related health problems. Is Patient ready to commit to change? Yes If Yes: 
? Set goal 
? Plan 
? Educational materials provided Harmful use or Dependence (Score 16 or greater) ? Discuss short term and long term health risks of consuming alcohol ? Recommendations ? Negotiate drinking goal 
? Recommend addiction specialist/center ? Arrange follow-up appointments. Tobacco - patient is a smoker: Have You Used Tobacco in the Past 30 Days: Yes Illegal Drugs use: Have You Used Any Illegal Substances Over the Past 12 Months: No 
 
24 hour chart check complete: yes Patient goal(s) for today: meet treatment team, acclimate to unit Treatment team focus/goals: assess needs for treatment and safe discharge Progress note: Pt was voluntarily addmitted to [facility] for worsening EtOH use from depression and anxiety. He wants help quitting EtOH but does not want to be on BHU. Pt is asking to leave, his patient rights were explained as well as [place] crisis assessment process. He agreed to be assessed and wants to leave. Patient later declined to meet with [facility] . 
  
LOS:  1  Expected LOS: TBD Financial concerns/prescription coverage:  [employer] HEALTHCARE HMO/CHOICE PLUS/POS Family contact: wife, Marilyn Donahue ([phone]) Family requesting physician contact today:  MSW spoke with wife who wants Pt to stay on BHU to receive treatment. Discharge plan: return home Access to weapons : no Outpatient provider(s): to be linked Patient's preferred phone number for follow up call : [phone] Participating treatment team members: Ish Archuleta NP; Wilder Ley, LINDA; Soren Sheehan, PharmD; GO Hernández

## 2020-09-09 NOTE — PROGRESS NOTES
[facility]  Master Treatment Plan for Dena Bartlett    Date Treatment Plan Initiated: 9/9/20    Treatment Plan Modalities:  Type of Modality Amount  (x minutes) Frequency (x/week) Duration (x days) Name of Responsible Staff   [address] [facility] meetings to encourage peer interactions 13 7 1 Ayanna HARRELL     Group psychotherapy to assist in building coping skills and internal controls 60 7 1 Hardy Walden   Therapeutic activity groups to build coping skills 60 7 1 Hardy Walden   Psychoeducation in group setting to address:   Medication education   [phone] skills         Relaxation techniques         Symptom management         Discharge planning   60 2 [address] [facility]    60 2 1 raghavendra LOPEZ   61 1 1 volunteer   Recovery/AA/NA      volunteer   Physician medication management   15 7 [address]  NP   Family meeting/discharge planning   15 2 [address] and Skylar Collier                                   Problem: Alcohol Withdrawal  Goal: *STG: Participates in treatment plan  Outcome: Progressing Towards Goal  Note: Out on unit social and appears sad. Describes hopeless and anxiety. CIWA unremarkable. Admission purpose is to detox safely. States he was not educated in ED that he would be admitted to [facility] and length of stay would be based on his detox. Request to d/c order obtained to TDO assessment. Will call Doris Hi to update them on his status.    Goal: *STG: Seeks staff when symptoms of withdrawal increase  Outcome: Progressing Towards Goal  Goal: *STG: Attends activities and groups  Outcome: Progressing Towards Goal  Goal: *STG: Will identify negative impact of chemical dependency including the use of tobacco, alcohol, and other substances  Outcome: Progressing Towards Goal  Goal: *STG: Agrees to participate in outpatient after care program to support ongoing mental health  Outcome: Progressing Towards Goal  Goal: Interventions  Outcome: Progressing Towards Goal     1507: discussed TDO process and his earlier request to discharge.  Pt states he will stay and verbalized understanding of his rights

## 2020-09-09 NOTE — BH NOTES
PRN Medication Documentation    Specific patient behavior that led to need for PRN medication: increase blood pressure  Staff interventions attempted prior to PRN being given: education  PRN medication given: Clonidine 0.1mg given @ 0842, B/P 178/108  Patient response/effectiveness of PRN medication: 1019: B/P 129/71

## 2020-09-09 NOTE — PROGRESS NOTES
Admission Medication Reconciliation:    Information obtained from:  patient interview, communication with Hansa Bowen ()  RxQuery data available¹:  YES (outdated)    Comments/Recommendations: Updated PTA meds/reviewed patient's allergies. 1)  Pharmacist called [facility] DR MAGALYS WILBURN to confirm medications. All filled recently. Patient also confirms medications. Of note, trazodone was removed as last filled in 3/2020.    2)  Medication changes (since last review):  Removed  - trazodone    3)  The [place] Prescription Monitoring Program () was assessed to determine fill history of any controlled medications. The patient has not filled any controlled medications except gabapentin since 11/2018. Fills gabapentin monthly. ¹RxQuery pharmacy benefit data reflects medications filled and processed through the patient's insurance, however   this data does NOT capture whether the medication was picked up or is currently being taken by the patient. Allergies:  Patient has no known allergies. Significant PMH/Disease States:   Past Medical History:   Diagnosis Date    Alcohol abuse     Alcoholic hepatitis     Anxiety     Atrial fibrillation ([facility]     Dental injury 10/2018    Hypertension     Ill-defined condition     a-fib    Renal failure      Chief Complaint for this Admission:    Chief Complaint   Patient presents with    Suicidal    Alcohol Problem     Prior to Admission Medications:   Prior to Admission Medications   Prescriptions Last Dose Informant Taking?    amLODIPine-benazepril (LOTREL) 5-20 mg per capsule 9/8/2020 at Unknown time  Yes   Sig: Take 1 capsule by mouth once daily   gabapentin (NEURONTIN) 600 mg tablet 9/8/2020 at Unknown time  Yes   Sig: TAKE 1 TABLET BY MOUTH THREE TIMES DAILY   metoprolol succinate (TOPROL-XL) 25 mg XL tablet Unknown at Unknown time  No   Sig: Take 1 tablet by mouth nightly      Facility-Administered Medications: None     Logan Buerger, [place]

## 2020-09-10 PROCEDURE — 65220000003 HC RM SEMIPRIVATE PSYCH

## 2020-09-10 PROCEDURE — 74011250637 HC RX REV CODE- 250/637: Performed by: NURSE PRACTITIONER

## 2020-09-10 PROCEDURE — 74011250637 HC RX REV CODE- 250/637: Performed by: PSYCHIATRY & NEUROLOGY

## 2020-09-10 RX ORDER — NALTREXONE HYDROCHLORIDE 50 MG/1
50 TABLET, FILM COATED ORAL DAILY
Status: DISCONTINUED | OUTPATIENT
Start: 2020-09-10 | End: 2020-09-12 | Stop reason: HOSPADM

## 2020-09-10 RX ORDER — ASPIRIN/CALCIUM CARB/MAGNESIUM 325 MG
4 TABLET ORAL
Status: DISCONTINUED | OUTPATIENT
Start: 2020-09-10 | End: 2020-09-12 | Stop reason: HOSPADM

## 2020-09-10 RX ORDER — GABAPENTIN 300 MG/1
300 CAPSULE ORAL 3 TIMES DAILY
Status: DISCONTINUED | OUTPATIENT
Start: 2020-09-10 | End: 2020-09-11

## 2020-09-10 RX ADMIN — PHENOBARBITAL 32.4 MG: 32.4 TABLET ORAL at 08:06

## 2020-09-10 RX ADMIN — TRAZODONE HYDROCHLORIDE 50 MG: 50 TABLET ORAL at 23:02

## 2020-09-10 RX ADMIN — NICOTINE POLACRILEX 4 MG: 4 LOZENGE ORAL at 13:15

## 2020-09-10 RX ADMIN — GABAPENTIN 300 MG: 300 CAPSULE ORAL at 16:16

## 2020-09-10 RX ADMIN — GABAPENTIN 300 MG: 300 CAPSULE ORAL at 21:36

## 2020-09-10 RX ADMIN — NICOTINE POLACRILEX 4 MG: 4 LOZENGE ORAL at 16:16

## 2020-09-10 RX ADMIN — NICOTINE POLACRILEX 4 MG: 4 LOZENGE ORAL at 23:02

## 2020-09-10 RX ADMIN — CLONIDINE HYDROCHLORIDE 0.1 MG: 0.1 TABLET ORAL at 08:06

## 2020-09-10 RX ADMIN — PHENOBARBITAL 32.4 MG: 32.4 TABLET ORAL at 16:16

## 2020-09-10 RX ADMIN — Medication 100 MG: at 08:06

## 2020-09-10 RX ADMIN — MULTIPLE VITAMINS W/ MINERALS TAB 1 TABLET: TAB at 08:06

## 2020-09-10 RX ADMIN — NALTREXONE HYDROCHLORIDE 50 MG: 50 TABLET, FILM COATED ORAL at 10:44

## 2020-09-10 RX ADMIN — GABAPENTIN 300 MG: 300 CAPSULE ORAL at 10:44

## 2020-09-10 RX ADMIN — PHENOBARBITAL 32.4 MG: 32.4 TABLET ORAL at 13:15

## 2020-09-10 RX ADMIN — FOLIC ACID 1 MG: 1 TABLET ORAL at 08:06

## 2020-09-10 RX ADMIN — NICOTINE POLACRILEX 4 MG: 4 LOZENGE ORAL at 11:14

## 2020-09-10 RX ADMIN — NICOTINE POLACRILEX 4 MG: 4 LOZENGE ORAL at 20:43

## 2020-09-10 NOTE — PROGRESS NOTES
Problem: Falls - Risk of  Goal: *Absence of Falls  Description: Document [facility] Fall Risk and appropriate interventions in the flowsheet.   Outcome: Progressing Towards Goal  Note: Fall Risk Interventions:            Medication Interventions: Teach patient to arise slowly

## 2020-09-10 NOTE — H&P
[facility] HISTORY AND PHYSICAL    Name:  Prem Burr  MR#:  [id number]  :  1975  ACCOUNT #:  [de-identified]  ADMIT DATE:  2020    CHIEF COMPLAINT:  \"I want to go home. \"    HISTORY OF PRESENTING ILLNESS:  The patient is a 79-year-old male who is currently admitted at [address] [place] [address] on a voluntary basis. He has a prolific history of alcohol dependence. He states that he has been drinking for 30 years. He drinks about 10 to 12 beers a day. He denies history of seizures or delirium tremens. He states that he was misunderstood when he went to the emergency room. He states that he went to the emergency room because he was under the impression that he will be prescribed some antidepressant and medication for detox, but he was never informed that he would be admitted at [facility]. He also states that when he said that \"I was over it. I'm just done,\" he was referring about his alcoholism and denies that he was being suicidal.  He states that he will never hurt himself. He attended the [facility] in 2018, and was sober for 3 months after that. He states that he was feeling slightly depressed, \"this year has not been good\". He is adamant to be discharged. He does not think that he needs to be admitted. He is informed because he is detoxing that he poses high risk for seizures. He is still persistent, we will have to call [address] [place] [address] for TDO evaluation. He denies suicidal ideation, homicidal ideation, auditory or visual hallucination.     PAST MEDICAL HISTORY:  See H and P.    Labs: (reviewed/updated 9/10/2020)  Patient Vitals for the past 8 hrs:   BP Temp Pulse Resp SpO2   09/10/20 0733 (!) 155/96 97.8 °F (36.6 °C) 77 18 98 %     Labs Reviewed   ACETAMINOPHEN - Abnormal; Notable for the following components:       Result Value    Acetaminophen level <2 (*)     All other components within normal limits   CBC WITH AUTOMATED DIFF - Abnormal; Notable for the following components:    HGB 17.4 (*)     HCT 50.6 (*)     MPV 8.8 (*)     All other components within normal limits   METABOLIC PANEL, COMPREHENSIVE - Abnormal; Notable for the following components:    Anion gap 3 (*)     All other components within normal limits   ETHYL ALCOHOL - Abnormal; Notable for the following components:    ALCOHOL(ETHYL),SERUM 243 (*)     All other components within normal limits   URINE CULTURE HOLD SAMPLE   SALICYLATE   SAMPLES BEING HELD   DRUG SCREEN, URINE   SARS-COV-2   SARS-COV-2 ANTIGEN DETECTION     Lab Results   Component Value Date/Time    Sodium 142 09/08/2020 03:47 PM    Potassium 3.6 09/08/2020 03:47 PM    Chloride 108 09/08/2020 03:47 PM    CO2 31 09/08/2020 03:47 PM    Anion gap 3 (L) 09/08/2020 03:47 PM    Glucose 84 09/08/2020 03:47 PM    BUN 10 09/08/2020 03:47 PM    Creatinine 0.80 09/08/2020 03:47 PM    BUN/Creatinine ratio 13 09/08/2020 03:47 PM    GFR est AA >60 09/08/2020 03:47 PM    GFR est non-AA >60 09/08/2020 03:47 PM    Calcium 9.4 09/08/2020 03:47 PM    Bilirubin, total 0.3 09/08/2020 03:47 PM    Alk.  phosphatase 82 09/08/2020 03:47 PM    Protein, total 8.1 09/08/2020 03:47 PM    Albumin 4.2 09/08/2020 03:47 PM    Globulin 3.9 09/08/2020 03:47 PM    A-G Ratio 1.1 09/08/2020 03:47 PM    ALT (SGPT) 28 09/08/2020 03:47 PM     Admission on 09/08/2020   Component Date Value Ref Range Status    Salicylate level 26/34/5622 3.2  2.8 - 20.0 MG/DL Final    Acetaminophen level 09/08/2020 <2* 10 - 30 ug/mL Final    WBC 09/08/2020 8.5  4.1 - 11.1 K/uL Final    RBC 09/08/2020 5.21  4.10 - 5.70 M/uL Final    HGB 09/08/2020 17.4* 12.1 - 17.0 g/dL Final    HCT 09/08/2020 50.6* 36.6 - 50.3 % Final    MCV 09/08/2020 97.1  80.0 - 99.0 FL Final    MCH 09/08/2020 33.4  26.0 - 34.0 PG Final    MCHC 09/08/2020 34.4  30.0 - 36.5 g/dL Final    RDW 09/08/2020 12.6  11.5 - 14.5 % Final    PLATELET 21/82/9841 013  150 - 400 K/uL Final  MPV 09/08/2020 8.8* 8.9 - 12.9 FL Final    NRBC 09/08/2020 0.0  0  WBC Final    ABSOLUTE NRBC 09/08/2020 0.00  0.00 - 0.01 K/uL Final    NEUTROPHILS 09/08/2020 59  32 - 75 % Final    LYMPHOCYTES 09/08/2020 33  12 - 49 % Final    MONOCYTES 09/08/2020 6  5 - 13 % Final    EOSINOPHILS 09/08/2020 1  0 - 7 % Final    BASOPHILS 09/08/2020 1  0 - 1 % Final    IMMATURE GRANULOCYTES 09/08/2020 0  0.0 - 0.5 % Final    ABS. NEUTROPHILS 09/08/2020 5.1  1.8 - 8.0 K/UL Final    ABS. LYMPHOCYTES 09/08/2020 2.8  0.8 - 3.5 K/UL Final    ABS. MONOCYTES 09/08/2020 0.5  0.0 - 1.0 K/UL Final    ABS. EOSINOPHILS 09/08/2020 0.1  0.0 - 0.4 K/UL Final    ABS. BASOPHILS 09/08/2020 0.1  0.0 - 0.1 K/UL Final    ABS. IMM. GRANS. 09/08/2020 0.0  0.00 - 0.04 K/UL Final    DF 09/08/2020 AUTOMATED    Final    Sodium 09/08/2020 142  136 - 145 mmol/L Final    Potassium 09/08/2020 3.6  3.5 - 5.1 mmol/L Final    Chloride 09/08/2020 108  97 - 108 mmol/L Final    CO2 09/08/2020 31  21 - 32 mmol/L Final    Anion gap 09/08/2020 3* 5 - 15 mmol/L Final    Glucose 09/08/2020 84  65 - 100 mg/dL Final    BUN 09/08/2020 10  6 - 20 MG/DL Final    Creatinine 09/08/2020 0.80  0.70 - 1.30 MG/DL Final    BUN/Creatinine ratio 09/08/2020 13  12 - 20   Final    GFR est AA 09/08/2020 >60  >60 ml/min/1.73m2 Final    GFR est non-AA 09/08/2020 >60  >60 ml/min/1.73m2 Final    Calcium 09/08/2020 9.4  8.5 - 10.1 MG/DL Final    Bilirubin, total 09/08/2020 0.3  0.2 - 1.0 MG/DL Final    ALT (SGPT) 09/08/2020 28  12 - 78 U/L Final    AST (SGOT) 09/08/2020 23  15 - 37 U/L Final    Alk.  phosphatase 09/08/2020 82  45 - 117 U/L Final    Protein, total 09/08/2020 8.1  6.4 - 8.2 g/dL Final    Albumin 09/08/2020 4.2  3.5 - 5.0 g/dL Final    Globulin 09/08/2020 3.9  2.0 - 4.0 g/dL Final    A-G Ratio 09/08/2020 1.1  1.1 - 2.2   Final    SAMPLES BEING HELD 09/08/2020 1BLU   Final    COMMENT 09/08/2020 Add-on orders for these samples will be processed based on acceptable specimen integrity and analyte stability, which may vary by analyte. Final    ALCOHOL(ETHYL),SERUM 09/08/2020 243* <10 MG/DL Final    AMPHETAMINES 09/08/2020 Negative  NEG   Final    BARBITURATES 09/08/2020 Negative  NEG   Final    BENZODIAZEPINES 09/08/2020 Negative  NEG   Final    COCAINE 09/08/2020 Negative  NEG   Final    METHADONE 09/08/2020 Negative  NEG   Final    OPIATES 09/08/2020 Negative  NEG   Final    PCP(PHENCYCLIDINE) 09/08/2020 Negative  NEG   Final    THC (TH-CANNABINOL) 09/08/2020 Negative  NEG   Final    Drug screen comment 09/08/2020 (NOTE)   Final    Urine culture hold 09/08/2020 Urine on hold in Microbiology dept for 2 days. If unpreserved urine is submitted, it cannot be used for addtional testing after 24 hours, recollection will be required. Final    Specimen source 09/08/2020 Nasopharyngeal    Final    Specimen source 09/08/2020 Nasopharyngeal    Final    COVID-19 rapid test 09/08/2020 Not detected  NOTD   Final    Specimen type 09/08/2020 NP Swab    Final    Health status 09/08/2020 Nasopharyngeal    Final    Specimen type 09/08/2020 NP Swab    Final    Health status 09/08/2020 Symptomatic Testing    Final    COVID-19 09/08/2020 Not Detected  Not Detected   Final     Vitals:    09/09/20 1200 09/09/20 1640 09/09/20 1950 09/10/20 0733   BP: 119/74 143/75 154/64 (!) 155/96   Pulse: 67 64 82 77   Resp: 16 16 16 18   Temp: 97.9 °F (36.6 °C) 98.1 °F (36.7 °C) 98.2 °F (36.8 °C) 97.8 °F (36.6 °C)   SpO2: 98% 97% 99% 98%   Weight:         No results found for this or any previous visit (from the past 24 hour(s)). RADIOLOGY REPORTS:  Results from Hospital Encounter encounter on 01/28/19   XR ANKLE RT MIN 3 V    Narrative EXAM: XR ANKLE RT MIN 3 V    INDICATION: popping sound when standing on toes followed by swelling at lateral  ankle and pain. COMPARISON: None.     FINDINGS: Three views of the right ankle demonstrate no fracture or disruption  of the ankle mortise. There is no other acute osseous or articular abnormality. The soft tissues are within normal limits. Impression IMPRESSION: No acute abnormality. No results found. PAST PSYCHIATRIC HISTORY:  This is his first psychiatric inpatient admission. He has a longstanding struggle with alcohol dependence as mentioned above. PSYCHOSOCIAL HISTORY:  He has been  for 17 years. He has a daughter and three stepchildren. He finished high school and took some courses in college. 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 He lives with his wife. MENTAL STATUS EXAMINATION:  He is alert and oriented in all spheres. He is dressed in street clothes. He reports his mood is okay. Affect is constricted. Speech, normal rate and rhythm. Thought process, logical and goal directed. He denies suicidal ideation, homicidal ideation, auditory or visual hallucination. Memory seems intact. Intelligence seems average. Insight is poor. Judgment is poor. DIAGNOSES:  Substance-induced mood disorder; alcohol use disorder, severe. TREATMENT PLANNING:  I will continue his inpatient stay. He will be provided with support and encouraged to attend groups. His safety will be monitored. His medications will be modified and assessed. Case Management will work on discharge planning. ASSETS AND STRENGTHS:  He has a supportive wife. He has stable housing. ESTIMATED LENGTH OF STAY:  5-7 days.         MAULIK RICK NP      [email or web address]  D:  09/10/2020 0:04  T:  09/10/2020 4:43  JOB #:  [id number]

## 2020-09-10 NOTE — PROGRESS NOTES
PSYCHIATRIC PROGRESS NOTE       Patient: Lynette Fofana MRN: [id number]  SSN: [id number]    YOB: 1975  Age: 38 y.o. Sex: male      Admit Date: 9/8/2020    LOS: 2 days       Chief Complaint:  I am a lot better. Interval History:  Lynette Fofana states he is a lot better. He is much calmer today, he decided to stay voluntary to get treated. He denies physical withdrawal symptoms at this point, mild tremulousness noted. VSS. He is interested in taking naltexone to help with cravings. States he is supposed to be on gabapentin 600mg tid to help with urges to drink. Denies si hi or avh. Visible in the unit, attending groups. Past Medical History:  Past Medical History:   Diagnosis Date    Alcohol abuse     Alcoholic hepatitis     Anxiety     Atrial fibrillation ([facility] [address]     Dental injury 10/2018    Hypertension     Ill-defined condition     a-fib    Renal failure          ALLERGIES:(reviewed/updated 9/10/2020)  No Known Allergies    Laboratory report:  Lab Results   Component Value Date/Time    WBC 8.5 09/08/2020 03:47 PM    HGB 17.4 (H) 09/08/2020 03:47 PM    HCT 50.6 (H) 09/08/2020 03:47 PM    PLATELET 964 24/84/9231 03:47 PM    MCV 97.1 09/08/2020 03:47 PM      Lab Results   Component Value Date/Time    Sodium 142 09/08/2020 03:47 PM    Potassium 3.6 09/08/2020 03:47 PM    Chloride 108 09/08/2020 03:47 PM    CO2 31 09/08/2020 03:47 PM    Anion gap 3 (L) 09/08/2020 03:47 PM    Glucose 84 09/08/2020 03:47 PM    BUN 10 09/08/2020 03:47 PM    Creatinine 0.80 09/08/2020 03:47 PM    BUN/Creatinine ratio 13 09/08/2020 03:47 PM    GFR est AA >60 09/08/2020 03:47 PM    GFR est non-AA >60 09/08/2020 03:47 PM    Calcium 9.4 09/08/2020 03:47 PM    Bilirubin, total 0.3 09/08/2020 03:47 PM    Alk.  phosphatase 82 09/08/2020 03:47 PM    Protein, total 8.1 09/08/2020 03:47 PM    Albumin 4.2 09/08/2020 03:47 PM    Globulin 3.9 09/08/2020 03:47 PM    A-G Ratio 1.1 09/08/2020 03:47 PM    ALT (SGPT) 28 09/08/2020 03:47 PM      Vitals:    09/09/20 1640 09/09/20 1950 09/10/20 0733 09/10/20 1000   BP: 143/75 154/64 (!) 155/96 147/87   Pulse: 64 82 77 80   Resp: 16 16 18    Temp: 98.1 °F (36.7 °C) 98.2 °F (36.8 °C) 97.8 °F (36.6 °C)    SpO2: 97% 99% 98%    Weight:           No results found for: VALF2, VALAC, VALP, VALPR, DS6, CRBAM, CRBAMP, CARB2, XCRBAM  No results found for: LITHM    Vital Signs  Patient Vitals for the past 24 hrs:   Temp Pulse Resp BP SpO2   09/10/20 1000  80  147/87    09/10/20 0733 97.8 °F (36.6 °C) 77 18 (!) 155/96 98 %   09/09/20 1950 98.2 °F (36.8 °C) 82 16 154/64 99 %   09/09/20 1640 98.1 °F (36.7 °C) 64 16 143/75 97 %   09/09/20 1200 97.9 °F (36.6 °C) 67 16 119/74 98 %   09/09/20 1019  67 16 129/71 97 %     Wt Readings from Last 3 Encounters:   09/08/20 77.8 kg (171 lb 8.3 oz)   12/17/19 95.4 kg (210 lb 6.4 oz)   12/16/19 94.5 kg (208 lb 6.4 oz)     Temp Readings from Last 3 Encounters:   09/10/20 97.8 °F (36.6 °C)   08/21/20 100.1 °F (37.8 °C)   12/17/19 98.7 °F (37.1 °C) (Oral)     BP Readings from Last 3 Encounters:   09/10/20 147/87   12/17/19 (!) 164/118   12/16/19 (!) 215/136     Pulse Readings from Last 3 Encounters:   09/10/20 80   08/21/20 88   12/17/19 83       Radiology (reviewed/updated 9/10/2020)  No results found. Side Effects: (reviewed/updated 9/10/2020)  None reported or admitted to.     Review of Systems: (reviewed/updated 9/10/2020)  Appetite: good  Sleep: good   All other Review of Systems: negative    Mental Status Exam:  Eye contact: Good eye contact  Psychomotor activity: Relaxed   Speech is spontaneous  Thought process: Logical and goal directed l  Mood is \"ok\"  Affect: Full  Perception: No avh  Suicidal ideation: No si  Homicidal ideation: No hi  Insight/judgment: Partial   Cognition is grossly intact      Physical Exam:  Musculoskeletal system: steady gait  Tremor not present  Cog wheeling not present      Assessment and Plan:  Korey Cedeno meets criteria for diagnoses of Substance-induced mood disorder; alcohol use disorder, severe. Naltrexone 50mg daily. Neurontin 300mg tid. Continue current medications as prescribed. We will closely monitor for safety. We will encourage reality orientation. Disposition planning to continue. I certify that this patients inpatient psychiatric hospital services furnished since the previous certification were, and continue to be, required for treatment that could reasonably be expected to improve the patient's condition, or for diagnostic study, and that the patient continues to need, on a daily basis, active treatment furnished directly by or requiring the supervision of inpatient psychiatric facility personnel. In addition, the hospital records show that services furnished were intensive treatment services, admission or related services, or equivalent services.       Signed:  Monica Caraballo NP  9/10/2020

## 2020-09-10 NOTE — PROGRESS NOTES
Problem: Alcohol Withdrawal  Goal: *STG: Participates in treatment plan  Outcome: Progressing Towards Goal  Patient participated in treatment team, patient will discharge Saturday with rx for Naltrexone  Goal: *STG: Attends activities and groups  Outcome: Progressing Towards Goal  Patient seen in dayroom/dining room attending groups and interacting with peers and staff.     Problem: Patient Education: Go to Patient Education Activity  Goal: Patient/Family Education  Outcome: Progressing Towards Goal

## 2020-09-10 NOTE — PROGRESS NOTES
Problem: Alcohol Withdrawal  Goal: *STG: Participates in treatment plan  Outcome: Progressing Towards Goal  Note: Out on unit quietly present. Social w peers. Denies SI, CIWA unremarkable. Thoughts organized, demonstrates appropriate behaviors and ability to follow staff direction. Evening goal is to talk with wife and stay active.  Staff ofcus is on offering support  Goal: *STG: Seeks staff when symptoms of withdrawal increase  Outcome: Progressing Towards Goal  Goal: *STG: Attends activities and groups  Outcome: Progressing Towards Goal  Goal: *STG: Will identify negative impact of chemical dependency including the use of tobacco, alcohol, and other substances  Outcome: Progressing Towards Goal  Goal: *STG: Agrees to participate in outpatient after care program to support ongoing mental health  Outcome: Progressing Towards Goal  Goal: Interventions  Outcome: Progressing Towards Goal

## 2020-09-10 NOTE — INTERDISCIPLINARY ROUNDS
Behavioral Health Interdisciplinary Rounds Patient Name: Delia Parada  Age: 38 y.o. Room/Bed:  746/ Primary Diagnosis: <principal problem not specified> Admission Status: Voluntary Readmission within 30 days: no 
Power of  in place: no 
Patient requires a blocked bed: no          Reason for blocked bed: VTE Prophylaxis: No 
 
Mobility needs/Fall risk: no 
Flu Vaccine : not flu season Nutritional Plan: no 
Consults:         
Labs/Testing due today?: no 
 
Sleep hours: 7 Participation in Care/Groups:  yes Medication Compliant?: Yes PRNS (last 24 hours): Sleep Aid and hypertention Restraints (last 24 hours):  no 
  
CIWA (range last 24 hours): CIWA-Ar Total: 0  
COWS (range last 24 hours): Alcohol screening (AUDIT) completed -   AUDIT Score: 27 If applicable, date SBIRT discussed in treatment team AND documented:  
AUDIT Screen Score: AUDIT Score: 27 Document Brief Intervention (corresponds directly with the 5 A's, Ask, Advise, Assess, Assist, and Arrange): At- Risk Patients (Score 7-15 for women; 8-15 for men) Discuss concern patient is drinking at unhealthy levels known to increase risk of alcohol-related health problems. Is Patient ready to commit to change? If No: 
? Encourage reflection ? Discuss short term and long term health risks of consuming alcohol ? Barriers to change ? Reaffirm willingness to help / Educational materials provided If Yes: 
? Set goal 
? Plan 
? Educational materials provided Harmful use or Dependence (Score 16 or greater) ? Discuss short term and long term health risks of consuming alcohol ? Recommendations ? Negotiate drinking goal 
? Recommend addiction specialist/center ? Arrange follow-up appointments. Tobacco - patient is a smoker: Have You Used Tobacco in the Past 30 Days: Yes Illegal Drugs use: Have You Used Any Illegal Substances Over the Past 12 Months: No 
 
 24 hour chart check complete: yes Patient goal(s) for today:  
Treatment team focus/goals:  
Progress note LOS:  2  Expected LOS:  
 
Financial concerns/prescription coverage:   
Family contact:      
Family requesting physician contact today:   
Discharge plan: Access to weapons :        
Outpatient provider(s):  
Patient's preferred phone number for follow up call :  
 
Participating treatment team members: Kwesi Phillips, * (assigned SW),

## 2020-09-11 PROCEDURE — 74011250637 HC RX REV CODE- 250/637: Performed by: NURSE PRACTITIONER

## 2020-09-11 PROCEDURE — 65220000003 HC RM SEMIPRIVATE PSYCH

## 2020-09-11 RX ORDER — NALTREXONE HYDROCHLORIDE 50 MG/1
50 TABLET, FILM COATED ORAL DAILY
Qty: 30 TAB | Refills: 0 | Status: SHIPPED | OUTPATIENT
Start: 2020-09-11 | End: 2020-12-06 | Stop reason: ALTCHOICE

## 2020-09-11 RX ORDER — GABAPENTIN 300 MG/1
600 CAPSULE ORAL 3 TIMES DAILY
Status: DISCONTINUED | OUTPATIENT
Start: 2020-09-11 | End: 2020-09-12 | Stop reason: HOSPADM

## 2020-09-11 RX ORDER — LISINOPRIL 20 MG/1
20 TABLET ORAL DAILY
Status: DISCONTINUED | OUTPATIENT
Start: 2020-09-11 | End: 2020-09-12 | Stop reason: HOSPADM

## 2020-09-11 RX ORDER — GABAPENTIN 600 MG/1
600 TABLET ORAL 3 TIMES DAILY
Qty: 45 TAB | Refills: 0 | Status: SHIPPED | OUTPATIENT
Start: 2020-09-11 | End: 2020-10-27 | Stop reason: SDUPTHER

## 2020-09-11 RX ORDER — METOPROLOL SUCCINATE 25 MG/1
25 TABLET, EXTENDED RELEASE ORAL DAILY
Status: DISCONTINUED | OUTPATIENT
Start: 2020-09-11 | End: 2020-09-12 | Stop reason: HOSPADM

## 2020-09-11 RX ORDER — AMLODIPINE BESYLATE 5 MG/1
5 TABLET ORAL DAILY
Status: DISCONTINUED | OUTPATIENT
Start: 2020-09-11 | End: 2020-09-12 | Stop reason: HOSPADM

## 2020-09-11 RX ADMIN — NICOTINE POLACRILEX 4 MG: 4 LOZENGE ORAL at 08:48

## 2020-09-11 RX ADMIN — PHENOBARBITAL 32.4 MG: 32.4 TABLET ORAL at 17:19

## 2020-09-11 RX ADMIN — NICOTINE POLACRILEX 4 MG: 4 LOZENGE ORAL at 21:30

## 2020-09-11 RX ADMIN — METOPROLOL SUCCINATE 25 MG: 25 TABLET, EXTENDED RELEASE ORAL at 11:31

## 2020-09-11 RX ADMIN — TRAZODONE HYDROCHLORIDE 50 MG: 50 TABLET ORAL at 22:55

## 2020-09-11 RX ADMIN — FOLIC ACID 1 MG: 1 TABLET ORAL at 10:22

## 2020-09-11 RX ADMIN — NICOTINE POLACRILEX 4 MG: 4 LOZENGE ORAL at 13:27

## 2020-09-11 RX ADMIN — MULTIPLE VITAMINS W/ MINERALS TAB 1 TABLET: TAB at 08:44

## 2020-09-11 RX ADMIN — AMLODIPINE BESYLATE 5 MG: 5 TABLET ORAL at 11:31

## 2020-09-11 RX ADMIN — CLONIDINE HYDROCHLORIDE 0.1 MG: 0.1 TABLET ORAL at 17:19

## 2020-09-11 RX ADMIN — NALTREXONE HYDROCHLORIDE 50 MG: 50 TABLET, FILM COATED ORAL at 11:32

## 2020-09-11 RX ADMIN — NICOTINE POLACRILEX 4 MG: 4 LOZENGE ORAL at 19:14

## 2020-09-11 RX ADMIN — GABAPENTIN 600 MG: 300 CAPSULE ORAL at 17:19

## 2020-09-11 RX ADMIN — GABAPENTIN 600 MG: 300 CAPSULE ORAL at 21:28

## 2020-09-11 RX ADMIN — GABAPENTIN 300 MG: 300 CAPSULE ORAL at 08:45

## 2020-09-11 RX ADMIN — PHENOBARBITAL 32.4 MG: 32.4 TABLET ORAL at 09:00

## 2020-09-11 RX ADMIN — PHENOBARBITAL 32.4 MG: 32.4 TABLET ORAL at 08:45

## 2020-09-11 RX ADMIN — NICOTINE POLACRILEX 4 MG: 4 LOZENGE ORAL at 15:45

## 2020-09-11 RX ADMIN — Medication 100 MG: at 08:44

## 2020-09-11 RX ADMIN — NICOTINE POLACRILEX 4 MG: 4 LOZENGE ORAL at 11:07

## 2020-09-11 NOTE — PROGRESS NOTES
Pharmacist Discharge Medication Reconciliation (anticipated date of discharge 9/12/20)    Discharging Provider: Flor Saldaña NP    Significant PMH:   Past Medical History:   Diagnosis Date    Alcohol abuse     Alcoholic hepatitis     Anxiety     Atrial fibrillation ([facility] [address]     Dental injury 10/2018    Hypertension     Ill-defined condition     a-fib    Renal failure      Chief Complaint for this Admission:   Chief Complaint   Patient presents with    Suicidal    Alcohol Problem     Allergies: Patient has no known allergies. Discharge Medications:   Current Discharge Medication List        START taking these medications    Details   naltrexone (DEPADE) 50 mg tablet Take 1 Tab by mouth daily. Indications: alcoholism  Qty: 30 Tab, Refills: 0           CONTINUE these medications which have CHANGED    Details   gabapentin (NEURONTIN) 600 mg tablet Take 1 Tab by mouth three (3) times daily. Max Daily Amount: 1,800 mg. Indications: alcoholism  Qty: 39 Tab, Refills: 0    Associated Diagnoses: Alcohol abuse, in remission           CONTINUE these medications which have NOT CHANGED    Details   amLODIPine-benazepril (LOTREL) 5-20 mg per capsule Take 1 capsule by mouth once daily  Qty: 30 Cap, Refills: 0    Associated Diagnoses: Essential hypertension;  Accelerated hypertension      metoprolol succinate (TOPROL-XL) 25 mg XL tablet Take 1 tablet by mouth nightly  Qty: 30 Tab, Refills: 2    Associated Diagnoses: Accelerated hypertension; Essential hypertension           The patient's chart, MAR and AVS were reviewed by Hillary Washburn PHARMD.

## 2020-09-11 NOTE — PROGRESS NOTES
Problem: Alcohol Withdrawal  Goal: *STG: Participates in treatment plan  Outcome: Progressing Towards Goal  Note: CIWA unremarkable, denies SI, demonstrates appropriate behaviors and ability to follow staff direction and unit routine. Daily goal is to d/c home.  Staff focus is on d/c planning  Goal: *STG: Seeks staff when symptoms of withdrawal increase  Outcome: Progressing Towards Goal  Goal: *STG: Attends activities and groups  Outcome: Progressing Towards Goal  Goal: Interventions  Outcome: Progressing Towards Goal

## 2020-09-11 NOTE — DISCHARGE SUMMARY
PSYCHIATRIC DISCHARGE SUMMARY    Patient: Chevy Griffiths MRN: [id number]  SSN: [id number]    YOB: 1975  Age: 38 y.o. Sex: male        Date of Admission: 9/8/2020  Date of Discharge: 9/12/2020      Type of Discharge:  REGULAR     Admission data:  CHIEF COMPLAINT:  \"I want to go home. \"     HISTORY OF PRESENTING ILLNESS:  The patient is a 67-year-old male who is currently admitted at [address] [place] [address] on a voluntary basis. He has a prolific history of alcohol dependence. He states that he has been drinking for 30 years. He drinks about 10 to 12 beers a day. He denies history of seizures or delirium tremens. He states that he was misunderstood when he went to the emergency room. He states that he went to the emergency room because he was under the impression that he will be prescribed some antidepressant and medication for detox, but he was never informed that he would be admitted at [facility]. He also states that when he said that \"I was over it. I'm just done,\" he was referring about his alcoholism and denies that he was being suicidal.  He states that he will never hurt himself. He attended the [facility] in 2018, and was sober for 3 months after that. He states that he was feeling slightly depressed, \"this year has not been good\". He is adamant to be discharged. He does not think that he needs to be admitted. He is informed because he is detoxing that he poses high risk for seizures. He is still persistent, we will have to call [address] [place] [address] for TDO evaluation.   He denies suicidal ideation, homicidal ideation, auditory or visual hallucination.     PAST MEDICAL HISTORY:  See H and P.     Labs: (reviewed/updated 9/10/2020)  Patient Vitals for the past 8 hrs:    BP Temp Pulse Resp SpO2   09/10/20 0733 (!) 155/96 97.8 °F (36.6 °C) 77 18 98 %           Labs Reviewed   ACETAMINOPHEN - Abnormal; Notable for the following components:       Result Value      Acetaminophen level <2 (*)       All other components within normal limits   CBC WITH AUTOMATED DIFF - Abnormal; Notable for the following components:     HGB 17.4 (*)       HCT 50.6 (*)       MPV 8.8 (*)       All other components within normal limits   METABOLIC PANEL, COMPREHENSIVE - Abnormal; Notable for the following components:     Anion gap 3 (*)       All other components within normal limits   ETHYL ALCOHOL - Abnormal; Notable for the following components:     ALCOHOL(ETHYL),SERUM 243 (*)       All other components within normal limits   URINE CULTURE HOLD SAMPLE   SALICYLATE   SAMPLES BEING HELD   DRUG SCREEN, URINE   SARS-COV-2   SARS-COV-2 ANTIGEN DETECTION           Lab Results   Component Value Date/Time     Sodium 142 09/08/2020 03:47 PM     Potassium 3.6 09/08/2020 03:47 PM     Chloride 108 09/08/2020 03:47 PM     CO2 31 09/08/2020 03:47 PM     Anion gap 3 (L) 09/08/2020 03:47 PM     Glucose 84 09/08/2020 03:47 PM     BUN 10 09/08/2020 03:47 PM     Creatinine 0.80 09/08/2020 03:47 PM     BUN/Creatinine ratio 13 09/08/2020 03:47 PM     GFR est AA >60 09/08/2020 03:47 PM     GFR est non-AA >60 09/08/2020 03:47 PM     Calcium 9.4 09/08/2020 03:47 PM     Bilirubin, total 0.3 09/08/2020 03:47 PM     Alk.  phosphatase 82 09/08/2020 03:47 PM     Protein, total 8.1 09/08/2020 03:47 PM     Albumin 4.2 09/08/2020 03:47 PM     Globulin 3.9 09/08/2020 03:47 PM     A-G Ratio 1.1 09/08/2020 03:47 PM     ALT (SGPT) 28 09/08/2020 03:47 PM             Admission on 09/08/2020   Component Date Value Ref Range Status    Salicylate level 21/56/9679 3.2  2.8 - 20.0 MG/DL Final    Acetaminophen level 09/08/2020 <2* 10 - 30 ug/mL Final    WBC 09/08/2020 8.5  4.1 - 11.1 K/uL Final    RBC 09/08/2020 5.21  4.10 - 5.70 M/uL Final    HGB 09/08/2020 17.4* 12.1 - 17.0 g/dL Final    HCT 09/08/2020 50.6* 36.6 - 50.3 % Final    MCV 09/08/2020 97.1  80.0 - 99.0 FL Final    MCH 09/08/2020 33.4  26.0 - 34.0 PG Final  MCHC 09/08/2020 34.4  30.0 - 36.5 g/dL Final    RDW 09/08/2020 12.6  11.5 - 14.5 % Final    PLATELET 77/88/2181 533  150 - 400 K/uL Final    MPV 09/08/2020 8.8* 8.9 - 12.9 FL Final    NRBC 09/08/2020 0.0  0  WBC Final    ABSOLUTE NRBC 09/08/2020 0.00  0.00 - 0.01 K/uL Final    NEUTROPHILS 09/08/2020 59  32 - 75 % Final    LYMPHOCYTES 09/08/2020 33  12 - 49 % Final    MONOCYTES 09/08/2020 6  5 - 13 % Final    EOSINOPHILS 09/08/2020 1  0 - 7 % Final    BASOPHILS 09/08/2020 1  0 - 1 % Final    IMMATURE GRANULOCYTES 09/08/2020 0  0.0 - 0.5 % Final    ABS. NEUTROPHILS 09/08/2020 5.1  1.8 - 8.0 K/UL Final    ABS. LYMPHOCYTES 09/08/2020 2.8  0.8 - 3.5 K/UL Final    ABS. MONOCYTES 09/08/2020 0.5  0.0 - 1.0 K/UL Final    ABS. EOSINOPHILS 09/08/2020 0.1  0.0 - 0.4 K/UL Final    ABS. BASOPHILS 09/08/2020 0.1  0.0 - 0.1 K/UL Final    ABS. IMM. GRANS. 09/08/2020 0.0  0.00 - 0.04 K/UL Final    DF 09/08/2020 AUTOMATED    Final    Sodium 09/08/2020 142  136 - 145 mmol/L Final    Potassium 09/08/2020 3.6  3.5 - 5.1 mmol/L Final    Chloride 09/08/2020 108  97 - 108 mmol/L Final    CO2 09/08/2020 31  21 - 32 mmol/L Final    Anion gap 09/08/2020 3* 5 - 15 mmol/L Final    Glucose 09/08/2020 84  65 - 100 mg/dL Final    BUN 09/08/2020 10  6 - 20 MG/DL Final    Creatinine 09/08/2020 0.80  0.70 - 1.30 MG/DL Final    BUN/Creatinine ratio 09/08/2020 13  12 - 20   Final    GFR est AA 09/08/2020 >60  >60 ml/min/1.73m2 Final    GFR est non-AA 09/08/2020 >60  >60 ml/min/1.73m2 Final    Calcium 09/08/2020 9.4  8.5 - 10.1 MG/DL Final    Bilirubin, total 09/08/2020 0.3  0.2 - 1.0 MG/DL Final    ALT (SGPT) 09/08/2020 28  12 - 78 U/L Final    AST (SGOT) 09/08/2020 23  15 - 37 U/L Final    Alk.  phosphatase 09/08/2020 82  45 - 117 U/L Final    Protein, total 09/08/2020 8.1  6.4 - 8.2 g/dL Final    Albumin 09/08/2020 4.2  3.5 - 5.0 g/dL Final    Globulin 09/08/2020 3.9  2.0 - 4.0 g/dL Final    A-G Ratio 09/08/2020 1.1  1.1 - 2.2   Final    SAMPLES BEING HELD 09/08/2020 1BLU    Final    COMMENT 09/08/2020 Add-on orders for these samples will be processed based on acceptable specimen integrity and analyte stability, which may vary by analyte. Final    ALCOHOL(ETHYL),SERUM 09/08/2020 243* <10 MG/DL Final    AMPHETAMINES 09/08/2020 Negative  NEG   Final    BARBITURATES 09/08/2020 Negative  NEG   Final    BENZODIAZEPINES 09/08/2020 Negative  NEG   Final    COCAINE 09/08/2020 Negative  NEG   Final    METHADONE 09/08/2020 Negative  NEG   Final    OPIATES 09/08/2020 Negative  NEG   Final    PCP(PHENCYCLIDINE) 09/08/2020 Negative  NEG   Final    THC (TH-CANNABINOL) 09/08/2020 Negative  NEG   Final    Drug screen comment 09/08/2020 (NOTE)    Final    Urine culture hold 09/08/2020 Urine on hold in Microbiology dept for 2 days. If unpreserved urine is submitted, it cannot be used for addtional testing after 24 hours, recollection will be required.     Final    Specimen source 09/08/2020 Nasopharyngeal    Final    Specimen source 09/08/2020 Nasopharyngeal    Final    COVID-19 rapid test 09/08/2020 Not detected  NOTD   Final    Specimen type 09/08/2020 NP Swab    Final    Health status 09/08/2020 Nasopharyngeal    Final    Specimen type 09/08/2020 NP Swab    Final    Health status 09/08/2020 Symptomatic Testing    Final    COVID-19 09/08/2020 Not Detected  Not Detected   Final            Vitals:     09/09/20 1200 09/09/20 1640 09/09/20 1950 09/10/20 0733   BP: 119/74 143/75 154/64 (!) 155/96   Pulse: 67 64 82 77   Resp: 16 16 16 18   Temp: 97.9 °F (36.6 °C) 98.1 °F (36.7 °C) 98.2 °F (36.8 °C) 97.8 °F (36.6 °C)   SpO2: 98% 97% 99% 98%   Weight:             Recent Results   No results found for this or any previous visit (from the past 24 hour(s)).        RADIOLOGY REPORTS:       Results from Hospital Encounter encounter on 01/28/19   XR ANKLE RT MIN 3 V     Narrative EXAM: XR ANKLE RT MIN 3 V     INDICATION: popping sound when standing on toes followed by swelling at lateral  ankle and pain.     COMPARISON: None.     FINDINGS: Three views of the right ankle demonstrate no fracture or disruption  of the ankle mortise. There is no other acute osseous or articular abnormality. The soft tissues are within normal limits.        Impression IMPRESSION: No acute abnormality. No results found.           PAST PSYCHIATRIC HISTORY:  This is his first psychiatric inpatient admission. He has a longstanding struggle with alcohol dependence as mentioned above.     PSYCHOSOCIAL HISTORY:  He has been  for 17 years. He has a daughter and three stepchildren. He finished high school and took some courses in college. 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 He lives with his wife.     MENTAL STATUS EXAMINATION:  He is alert and oriented in all spheres. He is dressed in street clothes. He reports his mood is okay. Affect is constricted. Speech, normal rate and rhythm. Thought process, logical and goal directed. He denies suicidal ideation, homicidal ideation, auditory or visual hallucination. Memory seems intact. Intelligence seems average. Insight is poor. Judgment is poor.     DIAGNOSES:  Substance-induced mood disorder; alcohol use disorder, severe. Hospital Course:    Patient was admitted to the Psychiatric services for acute psychiatric stabilization in regards to symptomatology as described in the HPI above and placed on Q15 minute checks and suicide and withdrawal precautions. Standing medications were ordered. He was started on gabapentin, naltrexone. He was placed on detox per protocol. While on the unit Jerry Velez was involved in individual, group, occupational and milieu therapy. He improved gradually and was able to integrate into the milieu with help from the nursing staff. Patients symptoms improved gradually including si, lability, withdrawal syptoms.   He was appropriate in his interactions, and cooperative with medications and the unit routine. Please see individual progress notes for more specific details regarding patient's hospitalization course. Patient was discharged as per the plan. He had been doing well on the unit as per the report of the nursing staff and my observations. No PRN medication for agitation, seclusion or restraints were required during the last 48 hours of his stay. Martha Smith had improved progressively to the point of being stable for discharge and outpatient FU. At this time he did not offer any complaints. Patient denied any SI or HI. Denied any AH or VH. He denied any delusions. Was not considered a danger to self or to others and is safe for discharge. Will FU with his appointments and remains motivated to be in treatment. The patient verbalized understanding of his discharge instructions. Some parts of the discharge summary are from the initial Psychiatric interview that was done on admission by the admitting psychiatrist.       Allergies:(reviewed/updated 9/11/2020)  No Known Allergies    Side Effects: (reviewed/updated 9/11/2020)  None reported or admitted to.     Vital Signs:  Patient Vitals for the past 24 hrs:   Temp Pulse Resp BP SpO2   09/11/20 0829 97.1 °F (36.2 °C) 62 16 (!) 159/99 99 %   09/11/20 0746 97.1 °F (36.2 °C) 62   99 %   09/10/20 2040 97.8 °F (36.6 °C) 63 16 (!) 158/93    09/10/20 1709 97.9 °F (36.6 °C) 64 16 (!) 135/96      Wt Readings from Last 3 Encounters:   09/08/20 77.8 kg (171 lb 8.3 oz)   12/17/19 95.4 kg (210 lb 6.4 oz)   12/16/19 94.5 kg (208 lb 6.4 oz)     Temp Readings from Last 3 Encounters:   09/11/20 97.1 °F (36.2 °C)   08/21/20 100.1 °F (37.8 °C)   12/17/19 98.7 °F (37.1 °C) (Oral)     BP Readings from Last 3 Encounters:   09/11/20 (!) 159/99   12/17/19 (!) 164/118   12/16/19 (!) 215/136     Pulse Readings from Last 3 Encounters:   09/11/20 62   08/21/20 88   12/17/19 83       Labs: (reviewed/updated 9/11/2020)  No results found for this or any previous visit (from the past 24 hour(s)). No results found for: VALF2, VALAC, VALP, VALPR, DS6, CRBAM, CRBAMP, CARB2, XCRBAM  No results found for: [facility]    Radiology (reviewed/updated 9/11/2020)  No results found. Mental Status Exam on Discharge:  General appearance:   Sesar Martínez is a 38 y.o. WHITE OR  male who is well groomed, psychomotor activity is WNL  Eye contact: makes good eye contact  Speech: Spontaneous and coherent  Affect : Euthymic  Mood: \"OK\"  Thought Process: Logical, goal directed  Perception: Denies any AH or VH. Thought Content: Denies any SI or Plan  Insight: Partial  Judgement: Fair  Cognition: Intact grossly. Discharge Diagnoses:  Substance-induced mood disorder; alcohol use disorder, severe. Current Discharge Medication List      START taking these medications    Details   naltrexone (DEPADE) 50 mg tablet Take 1 Tab by mouth daily. Indications: alcoholism  Qty: 30 Tab, Refills: 0         CONTINUE these medications which have CHANGED    Details   gabapentin (NEURONTIN) 600 mg tablet Take 1 Tab by mouth three (3) times daily. Max Daily Amount: 1,800 mg. Indications: alcoholism  Qty: 39 Tab, Refills: 0    Associated Diagnoses: Alcohol abuse, in remission         CONTINUE these medications which have NOT CHANGED    Details   amLODIPine-benazepril (LOTREL) 5-20 mg per capsule Take 1 capsule by mouth once daily  Qty: 30 Cap, Refills: 0    Associated Diagnoses: Essential hypertension;  Accelerated hypertension      metoprolol succinate (TOPROL-XL) 25 mg XL tablet Take 1 tablet by mouth nightly  Qty: 30 Tab, Refills: 2    Associated Diagnoses: Accelerated hypertension; Essential hypertension                  Follow-up Information     Follow up With Specialties Details Why [address] [place] [address] [facility]    Call [phone] Monday-Friday between 8am-2pm and ask to complete and intake for mental health to be connected with psychiatry and therapy services. [facility]  [facility], [facility]  [phone]    Rand Tobias MD Internal Medicine   [facility]  [address]          WOUND CARE: none needed. Prognosis:   Good / Kasota Schiller based on nature of patient's pathology/ies and treatment compliance issues. Prognosis is greatly dependent upon patient's ability to  follow up on psychiatric/psychotherapy appointments as well as to comply with psychiatric medications as prescribed. I certify that this patients inpatient psychiatric hospital services furnished since the previous certification were, and continue to be, required for treatment that could reasonably be expected to improve the patient's condition, or for diagnostic study, and that the patient continues to need, on a daily basis, active treatment furnished directly by or requiring the supervision of inpatient psychiatric facility personnel. In addition, the hospital records show that services furnished were intensive treatment services, admission or related services, or equivalent services.      Signed:  Frances Rich NP  9/11/2020

## 2020-09-11 NOTE — INTERDISCIPLINARY ROUNDS
Behavioral Health Interdisciplinary Rounds Patient Name: Dayne De Los Santos  Age: 38 y.o. Room/Bed:  6/ Primary Diagnosis: <principal problem not specified> Admission Status: Voluntary Readmission within 30 days: no 
Power of  in place: no 
Patient requires a blocked bed: no          Reason for blocked bed: VTE Prophylaxis: No 
 
Mobility needs/Fall risk: no 
Flu Vaccine : no  
Nutritional Plan: no 
Consults:         
Labs/Testing due today?:  
 
Sleep hours:  6 Participation in Care/Groups:  
Medication Compliant?: Yes PRNS (last 24 hours): clonidine, nicotine Restraints (last 24 hours):  no 
  
CIWA (range last 24 hours): CIWA-Ar Total: 0  
COWS (range last 24 hours): Alcohol screening (AUDIT) completed -   AUDIT Score: 27 If applicable, date SBIRT discussed in treatment team AND documented:  
AUDIT Screen Score: AUDIT Score: 27 Document Brief Intervention (corresponds directly with the 5 A's, Ask, Advise, Assess, Assist, and Arrange): At- Risk Patients (Score 7-15 for women; 8-15 for men) Discuss concern patient is drinking at unhealthy levels known to increase risk of alcohol-related health problems. Is Patient ready to commit to change? If No: 
? Encourage reflection ? Discuss short term and long term health risks of consuming alcohol ? Barriers to change ? Reaffirm willingness to help / Educational materials provided If Yes: 
? Set goal 
? Plan 
? Educational materials provided Harmful use or Dependence (Score 16 or greater) ? Discuss short term and long term health risks of consuming alcohol ? Recommendations ? Negotiate drinking goal 
? Recommend addiction specialist/center ? Arrange follow-up appointments. Tobacco - patient is a smoker: Have You Used Tobacco in the Past 30 Days: Yes Illegal Drugs use: Have You Used Any Illegal Substances Over the Past 12 Months: No 
 
24 hour chart check complete: Patient goal(s) for today:  
Treatment team focus/goals:  
Progress note LOS:  3  Expected LOS: 
 
Financial concerns/prescription coverage:   
Family contact:      
Family requesting physician contact today:  
Discharge plan: Access to weapons :      
Outpatient provider(s):  
Patient's preferred phone number for follow up call :  
 
Participating treatment team members: Jonah Hanley, * (assigned SW),

## 2020-09-11 NOTE — PROGRESS NOTES
Pt received resting quietly in bed with eyes closed and no acute distress noted. Respirations equal and unlabored. Will continue to monitor q15 minute rounds. .      Problem: Falls - Risk of  Goal: *Absence of Falls  Description: Document Kaelyn Taylor Fall Risk and appropriate interventions in the flowsheet.   Outcome: Progressing Towards Goal  Note: Fall Risk Interventions:            Medication Interventions: Teach patient to arise slowly

## 2020-09-11 NOTE — PROGRESS NOTES
PSYCHIATRIC PROGRESS NOTE       Patient: Jonah Hanley MRN: [id number]  SSN: [id number]    YOB: 1975  Age: 38 y.o. Sex: male      Admit Date: 9/8/2020    LOS: 3 days       Chief Complaint:  I am a lot better. Interval History:  Jonah Hanley states he continues to feel better. Calm and pleasant, visible in the unit and interacts well with staff and peers. Denies si hi or avh. Sleeping and eating ok. He is tolerating initiation of naltrexone and gabapentin and denies side effects. We will go up on gabapentin to 600mg tid to help with preventing relapse. BP remains elevated, I am concern about DPB running high 90's. Start norvasc and lisinopril. Discharge in the next 24 hours if he contniues to do well. Past Medical History:  Past Medical History:   Diagnosis Date    Alcohol abuse     Alcoholic hepatitis     Anxiety     Atrial fibrillation ([facility] [address]     Dental injury 10/2018    Hypertension     Ill-defined condition     a-fib    Renal failure          ALLERGIES:(reviewed/updated 9/11/2020)  No Known Allergies    Laboratory report:  Lab Results   Component Value Date/Time    WBC 8.5 09/08/2020 03:47 PM    HGB 17.4 (H) 09/08/2020 03:47 PM    HCT 50.6 (H) 09/08/2020 03:47 PM    PLATELET 845 55/81/8851 03:47 PM    MCV 97.1 09/08/2020 03:47 PM      Lab Results   Component Value Date/Time    Sodium 142 09/08/2020 03:47 PM    Potassium 3.6 09/08/2020 03:47 PM    Chloride 108 09/08/2020 03:47 PM    CO2 31 09/08/2020 03:47 PM    Anion gap 3 (L) 09/08/2020 03:47 PM    Glucose 84 09/08/2020 03:47 PM    BUN 10 09/08/2020 03:47 PM    Creatinine 0.80 09/08/2020 03:47 PM    BUN/Creatinine ratio 13 09/08/2020 03:47 PM    GFR est AA >60 09/08/2020 03:47 PM    GFR est non-AA >60 09/08/2020 03:47 PM    Calcium 9.4 09/08/2020 03:47 PM    Bilirubin, total 0.3 09/08/2020 03:47 PM    Alk.  phosphatase 82 09/08/2020 03:47 PM    Protein, total 8.1 09/08/2020 03:47 PM    Albumin 4.2 09/08/2020 03:47 PM    Globulin 3.9 09/08/2020 03:47 PM    A-G Ratio 1.1 09/08/2020 03:47 PM    ALT (SGPT) 28 09/08/2020 03:47 PM      Vitals:    09/10/20 1709 09/10/20 2040 09/11/20 0746 09/11/20 0829   BP: (!) 135/96 (!) 158/93  (!) 159/99   Pulse: 64 63 62 62   Resp: 16 16  16   Temp: 97.9 °F (36.6 °C) 97.8 °F (36.6 °C) 97.1 °F (36.2 °C) 97.1 °F (36.2 °C)   SpO2:   99% 99%   Weight:           No results found for: VALF2, VALAC, VALP, VALPR, DS6, CRBAM, CRBAMP, CARB2, XCRBAM  No results found for: LITHM    Vital Signs  Patient Vitals for the past 24 hrs:   Temp Pulse Resp BP SpO2   09/11/20 0829 97.1 °F (36.2 °C) 62 16 (!) 159/99 99 %   09/11/20 0746 97.1 °F (36.2 °C) 62   99 %   09/10/20 2040 97.8 °F (36.6 °C) 63 16 (!) 158/93    09/10/20 1709 97.9 °F (36.6 °C) 64 16 (!) 135/96      Wt Readings from Last 3 Encounters:   09/08/20 77.8 kg (171 lb 8.3 oz)   12/17/19 95.4 kg (210 lb 6.4 oz)   12/16/19 94.5 kg (208 lb 6.4 oz)     Temp Readings from Last 3 Encounters:   09/11/20 97.1 °F (36.2 °C)   08/21/20 100.1 °F (37.8 °C)   12/17/19 98.7 °F (37.1 °C) (Oral)     BP Readings from Last 3 Encounters:   09/11/20 (!) 159/99   12/17/19 (!) 164/118   12/16/19 (!) 215/136     Pulse Readings from Last 3 Encounters:   09/11/20 62   08/21/20 88   12/17/19 83       Radiology (reviewed/updated 9/11/2020)  No results found. Side Effects: (reviewed/updated 9/11/2020)  None reported or admitted to.     Review of Systems: (reviewed/updated 9/11/2020)  Appetite: good  Sleep: good   All other Review of Systems: negative    Mental Status Exam:  Eye contact: Good eye contact  Psychomotor activity: Relaxed   Speech is spontaneous  Thought process: Logical and goal directed l  Mood is \"ok\"  Affect: Full  Perception: No avh  Suicidal ideation: No si  Homicidal ideation: No hi  Insight/judgment: Partial   Cognition is grossly intact      Physical Exam:  Musculoskeletal system: steady gait  Tremor not present  Cog wheeling not present      Assessment and Plan:  Chevy Griffiths meets criteria for diagnoses of Substance-induced mood disorder; alcohol use disorder, severe. norvasc 5mg  Lisinopril 20mg. Neurontin 600mg tid. Continue current medications as prescribed. We will closely monitor for safety. We will encourage reality orientation. Dc in am.      I certify that this patients inpatient psychiatric hospital services furnished since the previous certification were, and continue to be, required for treatment that could reasonably be expected to improve the patient's condition, or for diagnostic study, and that the patient continues to need, on a daily basis, active treatment furnished directly by or requiring the supervision of inpatient psychiatric facility personnel. In addition, the hospital records show that services furnished were intensive treatment services, admission or related services, or equivalent services.       Signed:  Neisha Contreras NP  9/11/2020

## 2020-09-12 VITALS
BODY MASS INDEX: 27.57 KG/M2 | SYSTOLIC BLOOD PRESSURE: 144 MMHG | DIASTOLIC BLOOD PRESSURE: 88 MMHG | HEIGHT: 66 IN | TEMPERATURE: 97.8 F | WEIGHT: 171.52 LBS | OXYGEN SATURATION: 99 % | HEART RATE: 61 BPM | RESPIRATION RATE: 16 BRPM

## 2020-09-12 PROCEDURE — 74011250637 HC RX REV CODE- 250/637: Performed by: NURSE PRACTITIONER

## 2020-09-12 RX ADMIN — NICOTINE POLACRILEX 4 MG: 4 LOZENGE ORAL at 12:40

## 2020-09-12 RX ADMIN — FOLIC ACID 1 MG: 1 TABLET ORAL at 08:03

## 2020-09-12 RX ADMIN — PHENOBARBITAL 16.2 MG: 32.4 TABLET ORAL at 08:06

## 2020-09-12 RX ADMIN — AMLODIPINE BESYLATE 5 MG: 5 TABLET ORAL at 08:03

## 2020-09-12 RX ADMIN — NICOTINE POLACRILEX 4 MG: 4 LOZENGE ORAL at 10:06

## 2020-09-12 RX ADMIN — GABAPENTIN 600 MG: 300 CAPSULE ORAL at 08:03

## 2020-09-12 RX ADMIN — Medication 100 MG: at 08:03

## 2020-09-12 RX ADMIN — METOPROLOL SUCCINATE 25 MG: 25 TABLET, EXTENDED RELEASE ORAL at 08:04

## 2020-09-12 RX ADMIN — MULTIPLE VITAMINS W/ MINERALS TAB 1 TABLET: TAB at 08:03

## 2020-09-12 RX ADMIN — NALTREXONE HYDROCHLORIDE 50 MG: 50 TABLET, FILM COATED ORAL at 08:03

## 2020-09-12 NOTE — BH NOTES
DISCHARGE SUMMARY    NAME:Clayton Morales  : 1975  MRN: [id number]    The patient Sesar Martínez exhibits the ability to control behavior in a less restrictive environment. Patient's level of functioning is improving. No assaultive/destructive behavior has been observed for the past 24 hours. No suicidal/homicidal threat or behavior has been observed for the past 24 hours. There is no evidence of serious medication side effects. Patient has not been in physical or protective restraints for at least the past 24 hours. If weapons involved, how are they secured? NA    Is patient aware of and in agreement with discharge plan? Yes    Arrangements for medication:  Prescriptions given to patient. Copy of discharge instructions to provider?: [facility] : [address] for transportation home:  Wife to      Keep all follow up appointments as scheduled, continue to take prescribed medications per physician instructions. Mental health crisis number:  [phone] or your local mental health crisis line number at ([address] [place] [address]) [phone]. DISCHARGE SUMMARY from Nurse    PATIENT INSTRUCTIONS:      What to do at Home:  Recommended activity: Activity as tolerated,     If you experience any of the following symptoms thoughts of harming self or feeling despair/worthlessness - call staff at [address] [place] [address] at [phone]    Suicidal thinking - immediately call your local crisis number at [phone]    *  Please give a list of your current medications to your Primary Care Provider. *  Please update this list whenever your medications are discontinued, doses are      changed, or new medications (including over-the-counter products) are added. *  Please carry medication information at all times in case of emergency situations.     These are general instructions for a healthy lifestyle:    No smoking/ No tobacco products/ Avoid exposure to second hand smoke  Surgeon General's Warning:  Quitting smoking now greatly reduces serious risk to your health. Obesity, smoking, and sedentary lifestyle greatly increases your risk for illness    A healthy diet, regular physical exercise & weight monitoring are important for maintaining a healthy lifestyle    You may be retaining fluid if you have a history of heart failure or if you experience any of the following symptoms:  Weight gain of 3 pounds or more overnight or 5 pounds in a week, increased swelling in our hands or feet or shortness of breath while lying flat in bed. Please call your doctor as soon as you notice any of these symptoms; do not wait until your next office visit. The discharge information has been reviewed with the patient. The patient verbalized understanding. Discharge medications reviewed with the patient and appropriate educational materials and side effects teaching were provided.   ___________________________________________________________________________________________________________________________________

## 2020-09-12 NOTE — INTERDISCIPLINARY ROUNDS
Behavioral Health Interdisciplinary Rounds Patient Name: Yulissa Mcdermott  Age: 38 y.o. Room/Bed:  746/ Primary Diagnosis: <principal problem not specified> Admission Status: Voluntary Readmission within 30 days: no 
Power of  in place: no 
Patient requires a blocked bed: no          Reason for blocked bed: VTE Prophylaxis: No 
 
Mobility needs/Fall risk: no 
Flu Vaccine : no  
Nutritional Plan: no 
Consults:         
Labs/Testing due today?: no 
 
Sleep hours:  6 Participation in Care/Groups:  yes Medication Compliant?: Yes PRNS (last 24 hours): Sleep Aid and HTN Restraints (last 24 hours):  no 
  
CIWA (range last 24 hours): CIWA-Ar Total: 0  
COWS (range last 24 hours): Alcohol screening (AUDIT) completed -   AUDIT Score: 27 If applicable, date SBIRT discussed in treatment team AND documented:  
AUDIT Screen Score: AUDIT Score: 27 Document Brief Intervention (corresponds directly with the 5 A's, Ask, Advise, Assess, Assist, and Arrange): At- Risk Patients (Score 7-15 for women; 8-15 for men) Discuss concern patient is drinking at unhealthy levels known to increase risk of alcohol-related health problems. Is Patient ready to commit to change? Yes  
 
? ISet goal: Abstain Alcohol ? Plan: F/U @ [facility] ? Educational materials provided No 
 
Harmful use or Dependence (Score 16 or greater) ? Discuss short term and long term health risks of consuming alcohol ? Recommendations ? Negotiate drinking goal 
? Recommend addiction specialist/center ? Arrange follow-up appointments. 9/14/2020 Call to schedule An Appt for  Services Tobacco - patient is a smoker: Have You Used Tobacco in the Past 30 Days: Yes Illegal Drugs use: Have You Used Any Illegal Substances Over the Past 12 Months: No 
 
24 hour chart check complete: yes Patient goal(s) for today:  
Treatment team focus/goals:  
Progress note LOS:  4  Expected LOS:  
 
 Financial concerns/prescription coverage:   
Family contact:      
Family requesting physician contact today:   
Discharge plan: Return home Access to weapons :  n/a Outpatient provider(s): [facility] - [facility] Patient's preferred phone number for follow up call :  
 
Participating treatment team members: Randee Dumont RN and CEDRICK Cota

## 2020-09-12 NOTE — DISCHARGE INSTRUCTIONS
DISCHARGE SUMMARY    NAME:Clayton Sears  : 1975  MRN: [id number]    The patient Lynette Fofana exhibits the ability to control behavior in a less restrictive environment. Patient's level of functioning is improving. No assaultive/destructive behavior has been observed for the past 24 hours. No suicidal/homicidal threat or behavior has been observed for the past 24 hours. There is no evidence of serious medication side effects. Patient has not been in physical or protective restraints for at least the past 24 hours. If weapons involved, how are they secured? NA    Is patient aware of and in agreement with discharge plan? Yes    Arrangements for medication:  Prescriptions given to patient-sent to [place] for pickup. Copy of discharge instructions to provider?: [facility] : [address] for transportation home:  Wife to      Keep all follow up appointments as scheduled, continue to take prescribed medications per physician instructions. Mental health crisis number:  520 or your local mental health crisis line number at ([address] [place] [address]) [phone]. DISCHARGE SUMMARY from Nurse    PATIENT INSTRUCTIONS:      What to do at Home:  Recommended activity: Activity as tolerated,     If you experience any of the following symptoms thoughts of harming self or feeling despair/worthlessness - call staff at [address] [place] [address] at [phone]    Suicidal thinking - immediately call your local crisis number at [phone]    *  Please give a list of your current medications to your Primary Care Provider. *  Please update this list whenever your medications are discontinued, doses are      changed, or new medications (including over-the-counter products) are added. *  Please carry medication information at all times in case of emergency situations.     These are general instructions for a healthy lifestyle:    No smoking/ No tobacco products/ Avoid exposure to second hand smoke  Surgeon General's Warning:  Quitting smoking now greatly reduces serious risk to your health. Obesity, smoking, and sedentary lifestyle greatly increases your risk for illness    A healthy diet, regular physical exercise & weight monitoring are important for maintaining a healthy lifestyle    You may be retaining fluid if you have a history of heart failure or if you experience any of the following symptoms:  Weight gain of 3 pounds or more overnight or 5 pounds in a week, increased swelling in our hands or feet or shortness of breath while lying flat in bed. Please call your doctor as soon as you notice any of these symptoms; do not wait until your next office visit. The discharge information has been reviewed with the patient. The patient verbalized understanding. Discharge medications reviewed with the patient and appropriate educational materials and side effects teaching were provided.   ___________________________________________________________________________________________________________________________________

## 2020-09-12 NOTE — PROGRESS NOTES
Problem: Alcohol Withdrawal  Goal: *STG: Participates in treatment plan  Outcome: Progressing Towards Goal  Note: Pt out on the unit and interacting with peers and staff. Plan for pt to be discharged today. No distress noted. Stated his wife will pick him up. Goal: Interventions  Outcome: Progressing Towards Goal     Problem: Patient Education: Go to Patient Education Activity  Goal: Patient/Family Education  Outcome: Progressing Towards Goal     Problem: Falls - Risk of  Goal: *Absence of Falls  Description: Document Jazmine Jennings Fall Risk and appropriate interventions in the flowsheet.   Outcome: Progressing Towards Goal  Note: Fall Risk Interventions:            Medication Interventions: Teach patient to arise slowly

## 2020-09-12 NOTE — PROGRESS NOTES
Problem: Alcohol Withdrawal  Goal: *STG: Participates in treatment plan  Outcome: Progressing Towards Goal  Pt participates in all therapeutic groups. CIWA scores are #0.

## 2020-09-12 NOTE — BH NOTES
Pt discharged with staff to family. No distress noted. All belongings returned to pt. Discharge instructions reviewed and signed with pt.

## 2020-09-12 NOTE — PROGRESS NOTES
Pt appears to be sleeping. No distress noted. Respirations WNL. Will continue to monitor q 15 for safety. Problem: Falls - Risk of  Goal: *Absence of Falls  Description: Document Fina Dear Fall Risk and appropriate interventions in the flowsheet.   Outcome: Progressing Towards Goal  Note: Fall Risk Interventions:            Medication Interventions: Teach patient to arise slowly

## 2020-09-12 NOTE — PROGRESS NOTES
PSYCHIATRIC PROGRESS NOTE       Patient: Irving Daniel MRN: [id number]  SSN: [id number]    YOB: 1975  Age: 38 y.o. Sex: male      Admit Date: 9/8/2020    LOS: 4 days       Chief Complaint:  I am a lot better. Interval History:  Irving Daniel states he continues to feel better. Calm and pleasant, visible in the unit and interacts well with staff and peers. Denies si hi or avh. Sleeping and eating ok. He is tolerating initiation of naltrexone and gabapentin and denies side effects. We will go up on gabapentin to 600mg tid to help with preventing relapse. BP remains elevated, I am concern about DPB running high 90's. Start norvasc and lisinopril. Discharge in the next 24 hours if he contniues to do well. 9/12 Irving Daniel reports feeling well and moods are good. Denies SI/HI/AH/VH. No aggression or violence. Appropriately interactive and aware. Tolerating medications well. Eating and sleeping fairly.       Past Medical History:  Past Medical History:   Diagnosis Date    Alcohol abuse     Alcoholic hepatitis     Anxiety     Atrial fibrillation ([facility] [address]     Dental injury 10/2018    Hypertension     Ill-defined condition     a-fib    Renal failure          ALLERGIES:(reviewed/updated 9/12/2020)  No Known Allergies    Laboratory report:  Lab Results   Component Value Date/Time    WBC 8.5 09/08/2020 03:47 PM    HGB 17.4 (H) 09/08/2020 03:47 PM    HCT 50.6 (H) 09/08/2020 03:47 PM    PLATELET 642 24/76/4257 03:47 PM    MCV 97.1 09/08/2020 03:47 PM      Lab Results   Component Value Date/Time    Sodium 142 09/08/2020 03:47 PM    Potassium 3.6 09/08/2020 03:47 PM    Chloride 108 09/08/2020 03:47 PM    CO2 31 09/08/2020 03:47 PM    Anion gap 3 (L) 09/08/2020 03:47 PM    Glucose 84 09/08/2020 03:47 PM    BUN 10 09/08/2020 03:47 PM    Creatinine 0.80 09/08/2020 03:47 PM    BUN/Creatinine ratio 13 09/08/2020 03:47 PM    GFR est AA >60 09/08/2020 03:47 PM    GFR est non-AA >60 09/08/2020 03:47 PM    Calcium 9.4 09/08/2020 03:47 PM    Bilirubin, total 0.3 09/08/2020 03:47 PM    Alk. phosphatase 82 09/08/2020 03:47 PM    Protein, total 8.1 09/08/2020 03:47 PM    Albumin 4.2 09/08/2020 03:47 PM    Globulin 3.9 09/08/2020 03:47 PM    A-G Ratio 1.1 09/08/2020 03:47 PM    ALT (SGPT) 28 09/08/2020 03:47 PM      Vitals:    09/12/20 0803 09/12/20 0822 09/12/20 0931 09/12/20 0958   BP: (!) 162/110 (!) 162/104  (!) 144/88   Pulse:  65  61   Resp:  16  16   Temp:  97.8 °F (36.6 °C)     SpO2:  99%     Weight:       Height:   5' 6\" (1.676 m)        No results found for: VALF2, VALAC, VALP, VALPR, DS6, CRBAM, CRBAMP, CARB2, XCRBAM  No results found for: LITHM    Vital Signs  Patient Vitals for the past 24 hrs:   Temp Pulse Resp BP SpO2   09/12/20 0958  61 16 (!) 144/88    09/12/20 0822 97.8 °F (36.6 °C) 65 16 (!) 162/104 99 %   09/12/20 0803    (!) 162/110    09/11/20 1935 97.6 °F (36.4 °C) 67 16 (!) 140/88 96 %   09/11/20 1700 97.6 °F (36.4 °C) 64 16 (!) 158/99 97 %     Wt Readings from Last 3 Encounters:   09/08/20 77.8 kg (171 lb 8.3 oz)   12/17/19 95.4 kg (210 lb 6.4 oz)   12/16/19 94.5 kg (208 lb 6.4 oz)     Temp Readings from Last 3 Encounters:   09/12/20 97.8 °F (36.6 °C)   08/21/20 100.1 °F (37.8 °C)   12/17/19 98.7 °F (37.1 °C) (Oral)     BP Readings from Last 3 Encounters:   09/12/20 (!) 144/88   12/17/19 (!) 164/118   12/16/19 (!) 215/136     Pulse Readings from Last 3 Encounters:   09/12/20 61   08/21/20 88   12/17/19 83       Radiology (reviewed/updated 9/12/2020)  No results found. Side Effects: (reviewed/updated 9/12/2020)  None reported or admitted to.     Review of Systems: (reviewed/updated 9/12/2020)  Appetite: good  Sleep: good   All other Review of Systems: negative    Mental Status Exam:  Eye contact: Good eye contact  Psychomotor activity: Relaxed   Speech is spontaneous  Thought process: Logical and goal directed l  Mood is \"ok\"  Affect: Full  Perception: No avh  Suicidal ideation: No si  Homicidal ideation: No hi  Insight/judgment: Partial   Cognition is grossly intact      Physical Exam:  Musculoskeletal system: steady gait  Tremor not present  Cog wheeling not present      Assessment and Plan:  Vasyl Li meets criteria for diagnoses of Substance-induced mood disorder; alcohol use disorder, severe. norvasc 5mg  Lisinopril 20mg. Neurontin 600mg tid. Continue current medications as prescribed. We will closely monitor for safety. We will encourage reality orientation. Dc today. I certify that this patients inpatient psychiatric hospital services furnished since the previous certification were, and continue to be, required for treatment that could reasonably be expected to improve the patient's condition, or for diagnostic study, and that the patient continues to need, on a daily basis, active treatment furnished directly by or requiring the supervision of inpatient psychiatric facility personnel. In addition, the hospital records show that services furnished were intensive treatment services, admission or related services, or equivalent services.       Signed:  Mami Joiner MD  9/12/2020

## 2020-09-12 NOTE — BH NOTES
Behavioral Health Transition Record to Provider    Patient Name: Gloria Bradshaw  YOB: 1975  Medical Record Number: [id number]  Date of Admission: 9/8/2020  Date of Discharge: 9/12/2020    Attending Provider: Vicente Jacobson MD  Discharging Provider: Dr Corinna Askew   To contact this individual call [phone] and ask the  to page. If unavailable, ask to be transferred to [facility] Provider on call. [facility] Provider will be available on call 24/7 and during holidays. Primary Care Provider: Saman Rizzo MD    No Known Allergies    Reason for Admission:       CHIEF COMPLAINT:  \"I want to go home. \"     HISTORY OF PRESENTING ILLNESS:  The patient is a 53-year-old male who is currently admitted at [address] [place] [address] on a voluntary basis. He has a prolific history of alcohol dependence. He states that he has been drinking for 30 years. He drinks about 10 to 12 beers a day. He denies history of seizures or delirium tremens. He states that he was misunderstood when he went to the emergency room. He states that he went to the emergency room because he was under the impression that he will be prescribed some antidepressant and medication for detox, but he was never informed that he would be admitted at [facility]. He also states that when he said that \"I was over it. I'm just done,\" he was referring about his alcoholism and denies that he was being suicidal.  He states that he will never hurt himself. He attended the [facility] in 2018, and was sober for 3 months after that. He states that he was feeling slightly depressed, \"this year has not been good\". He is adamant to be discharged. He does not think that he needs to be admitted. He is informed because he is detoxing that he poses high risk for seizures. He is still persistent, we will have to call [address] [place] [address] for TDO evaluation.   He denies suicidal ideation, homicidal ideation, auditory or visual hallucination.             Admission Diagnosis: Bipolar 1 disorder ([facility] [address] [F31.9]    * No surgery found *    Results for orders placed or performed during the hospital encounter of 09/08/20   URINE CULTURE HOLD SAMPLE    Specimen: Serum   Result Value Ref Range    Urine culture hold        Urine on hold in Microbiology dept for 2 days. If unpreserved urine is submitted, it cannot be used for addtional testing after 24 hours, recollection will be required. SALICYLATE   Result Value Ref Range    Salicylate level 3.2 2.8 - 20.0 MG/DL   ACETAMINOPHEN   Result Value Ref Range    Acetaminophen level <2 (L) 10 - 30 ug/mL   CBC WITH AUTOMATED DIFF   Result Value Ref Range    WBC 8.5 4.1 - 11.1 K/uL    RBC 5.21 4.10 - 5.70 M/uL    HGB 17.4 (H) 12.1 - 17.0 g/dL    HCT 50.6 (H) 36.6 - 50.3 %    MCV 97.1 80.0 - 99.0 FL    MCH 33.4 26.0 - 34.0 PG    MCHC 34.4 30.0 - 36.5 g/dL    RDW 12.6 11.5 - 14.5 %    PLATELET 869 947 - 575 K/uL    MPV 8.8 (L) 8.9 - 12.9 FL    NRBC 0.0 0  WBC    ABSOLUTE NRBC 0.00 0.00 - 0.01 K/uL    NEUTROPHILS 59 32 - 75 %    LYMPHOCYTES 33 12 - 49 %    MONOCYTES 6 5 - 13 %    EOSINOPHILS 1 0 - 7 %    BASOPHILS 1 0 - 1 %    IMMATURE GRANULOCYTES 0 0.0 - 0.5 %    ABS. NEUTROPHILS 5.1 1.8 - 8.0 K/UL    ABS. LYMPHOCYTES 2.8 0.8 - 3.5 K/UL    ABS. MONOCYTES 0.5 0.0 - 1.0 K/UL    ABS. EOSINOPHILS 0.1 0.0 - 0.4 K/UL    ABS. BASOPHILS 0.1 0.0 - 0.1 K/UL    ABS. IMM.  GRANS. 0.0 0.00 - 0.04 K/UL    DF AUTOMATED     METABOLIC PANEL, COMPREHENSIVE   Result Value Ref Range    Sodium 142 136 - 145 mmol/L    Potassium 3.6 3.5 - 5.1 mmol/L    Chloride 108 97 - 108 mmol/L    CO2 31 21 - 32 mmol/L    Anion gap 3 (L) 5 - 15 mmol/L    Glucose 84 65 - 100 mg/dL    BUN 10 6 - 20 MG/DL    Creatinine 0.80 0.70 - 1.30 MG/DL    BUN/Creatinine ratio 13 12 - 20      GFR est AA >60 >60 ml/min/1.73m2    GFR est non-AA >60 >60 ml/min/1.73m2    Calcium 9.4 8.5 - 10.1 MG/DL    Bilirubin, total 0.3 0.2 - 1.0 MG/DL    ALT (SGPT) 28 12 - 78 U/L    AST (SGOT) 23 15 - 37 U/L    Alk. phosphatase 82 45 - 117 U/L    Protein, total 8.1 6.4 - 8.2 g/dL    Albumin 4.2 3.5 - 5.0 g/dL    Globulin 3.9 2.0 - 4.0 g/dL    A-G Ratio 1.1 1.1 - 2.2     SAMPLES BEING HELD   Result Value Ref Range    SAMPLES BEING HELD 1BLU     COMMENT        Add-on orders for these samples will be processed based on acceptable specimen integrity and analyte stability, which may vary by analyte. ETHYL ALCOHOL   Result Value Ref Range    ALCOHOL(ETHYL),SERUM 243 (H) <10 MG/DL   DRUG SCREEN, URINE   Result Value Ref Range    AMPHETAMINES Negative NEG      BARBITURATES Negative NEG      BENZODIAZEPINES Negative NEG      COCAINE Negative NEG      METHADONE Negative NEG      OPIATES Negative NEG      PCP(PHENCYCLIDINE) Negative NEG      THC (TH-CANNABINOL) Negative NEG      Drug screen comment (NOTE)    SARS-COV-2   Result Value Ref Range    Specimen source Nasopharyngeal      Specimen source Nasopharyngeal      COVID-19 rapid test Not detected NOTD      Specimen type NP Swab      Health status Nasopharyngeal     SARS-COV-2 ANTIGEN DETECTION   Result Value Ref Range    Specimen type NP Swab      Health status Symptomatic Testing      COVID-19 Not Detected Not Detected         Immunizations administered during this encounter: There is no immunization history on file for this patient. Screening for Metabolic Disorders for Patients on Antipsychotic Medications  (Data obtained from the EMR)    Estimated Body Mass Index  Estimated body mass index is 27.68 kg/m² as calculated from the following:    Height as of this encounter: 5' 6\" (1.676 m). Weight as of this encounter: 77.8 kg (171 lb 8.3 oz).      Vital Signs/Blood Pressure  Visit Vitals  BP (!) 162/104 (BP 1 Location: Right arm)   Pulse 65   Temp 97.8 °F (36.6 °C)   Resp 16   Ht 5' 6\" (1.676 m)   Wt 77.8 kg (171 lb 8.3 oz)   SpO2 99%   BMI 27.68 kg/m²       Blood Glucose/Hemoglobin A1c  Lab Results   Component Value Date/Time    Glucose 84 2020 03:47 PM    Glucose (POC) 106 (H) 2015 07:17 AM       No results found for: HBA1C, HGBE8, IDQ7RDRI     Lipid Panel  No results found for: CHOL, CHOLX, CHLST, CHOLV, 047470, HDL, HDLP, LDL, LDLC, DLDLP, TGLX, TRIGL, TRIGP, CHHD, CHHDX     Discharge Diagnosis:     CHIEF COMPLAINT:  \"I want to go home. \"     HISTORY OF PRESENTING ILLNESS:  The patient is a 65-year-old male who is currently admitted at [address] [place] [address] on a voluntary basis. He has a prolific history of alcohol dependence. He states that he has been drinking for 30 years. He drinks about 10 to 12 beers a day. He denies history of seizures or delirium tremens. He states that he was misunderstood when he went to the emergency room. He states that he went to the emergency room because he was under the impression that he will be prescribed some antidepressant and medication for detox, but he was never informed that he would be admitted at [facility]. He also states that when he said that \"I was over it. I'm just done,\" he was referring about his alcoholism and denies that he was being suicidal.  He states that he will never hurt himself. He attended the [facility] in 2018, and was sober for 3 months after that. He states that he was feeling slightly depressed, \"this year has not been good\". He is adamant to be discharged. He does not think that he needs to be admitted. He is informed because he is detoxing that he poses high risk for seizures. He is still persistent, we will have to call [address] [place] [address] for TDO evaluation. He denies suicidal ideation, homicidal ideation, auditory or visual hallucination.         Discharge Plan:     NAME:Clayton Del Valle  :   1975  MRN:   [id number]     The patient Lidya Nix exhibits the ability to control behavior in a less restrictive environment.   Patient's level of functioning is improving. No assaultive/destructive behavior has been observed for the past 24 hours. No suicidal/homicidal threat or behavior has been observed for the past 24 hours. There is no evidence of serious medication side effects. Patient has not been in physical or protective restraints for at least the past 24 hours.     If weapons involved, how are they secured? NA     Is patient aware of and in agreement with discharge plan? Yes     Arrangements for medication:  Prescriptions given to patient.      Copy of discharge instructions to provider?: [facility] : [address] [facility] for transportation home:  Wife to       Keep all follow up appointments as scheduled, continue to take prescribed medications per physician instructions. Mental health crisis number:  582 or your local mental health crisis line number at ([address] [place] [address]) [phone].            DISCHARGE SUMMARY from Nurse    Discharge Medication List and Instructions:   Current Discharge Medication List      START taking these medications    Details   naltrexone (DEPADE) 50 mg tablet Take 1 Tab by mouth daily. Indications: alcoholism  Qty: 30 Tab, Refills: 0         CONTINUE these medications which have CHANGED    Details   gabapentin (NEURONTIN) 600 mg tablet Take 1 Tab by mouth three (3) times daily. Max Daily Amount: 1,800 mg. Indications: alcoholism  Qty: 39 Tab, Refills: 0    Associated Diagnoses: Alcohol abuse, in remission         CONTINUE these medications which have NOT CHANGED    Details   amLODIPine-benazepril (LOTREL) 5-20 mg per capsule Take 1 capsule by mouth once daily  Qty: 30 Cap, Refills: 0    Associated Diagnoses: Essential hypertension;  Accelerated hypertension      metoprolol succinate (TOPROL-XL) 25 mg XL tablet Take 1 tablet by mouth nightly  Qty: 30 Tab, Refills: 2    Associated Diagnoses: Accelerated hypertension; Essential hypertension             Unresulted Labs (24h ago, onward)    None To obtain results of studies pending at discharge, please contact [phone]    Follow-up Information     Follow up With Specialties Details Why [facility]    Call [phone] Monday-Friday between 8am-2pm and ask to complete and intake for mental health to be connected with psychiatry and therapy services. [facility]  [facility], [address] [place] [address]  [phone]    Randy Lui MD Internal Medicine   [address] [facility]  [address]  [facility] [phone]            Advanced Directive:   Does the patient have an appointed surrogate decision maker? No  Does the patient have a Medical Advance Directive? No  Does the patient have a Psychiatric Advance Directive? No  If the patient does not have a surrogate or Medical Advance Directive AND Psychiatric Advance Directive, the patient was offered information on these advance directives Patient declined to complete    Patient Instructions: Please continue all medications until otherwise directed by physician. Tobacco Cessation Discharge Plan:   Is the patient a smoker and needs referral for smoking cessation? Yes  Patient referred to the following for smoking cessation with an appointment? Refused     Patient was offered medication to assist with smoking cessation at discharge? No  Was education for smoking cessation added to the discharge instructions? Yes    Alcohol/Substance Abuse Discharge Plan:   Does the patient have a history of substance/alcohol abuse and requires a referral for treatment? Yes  Patient referred to the following for substance/alcohol abuse treatment with an appointment? Yes  Patient was offered medication to assist with alcohol cessation at discharge? No  Was education for substance/alcohol abuse added to discharge instructions?  Yes    Patient discharged to Home; discussed with patient/caregiver

## 2020-09-14 ENCOUNTER — OFFICE VISIT (OUTPATIENT)
Dept: INTERNAL MEDICINE CLINIC | Age: 45
End: 2020-09-14
Payer: COMMERCIAL

## 2020-09-14 VITALS
TEMPERATURE: 98 F | OXYGEN SATURATION: 98 % | WEIGHT: 177.8 LBS | DIASTOLIC BLOOD PRESSURE: 86 MMHG | SYSTOLIC BLOOD PRESSURE: 134 MMHG | RESPIRATION RATE: 16 BRPM | HEIGHT: 66 IN | HEART RATE: 74 BPM | BODY MASS INDEX: 28.57 KG/M2

## 2020-09-14 DIAGNOSIS — F41.9 ANXIETY: Primary | ICD-10-CM

## 2020-09-14 DIAGNOSIS — F10.21 ALCOHOLISM IN REMISSION (HCC): ICD-10-CM

## 2020-09-14 DIAGNOSIS — F31.9 BIPOLAR 1 DISORDER (HCC): ICD-10-CM

## 2020-09-14 PROCEDURE — 99496 TRANSJ CARE MGMT HIGH F2F 7D: CPT | Performed by: NURSE PRACTITIONER

## 2020-09-14 RX ORDER — ESCITALOPRAM OXALATE 5 MG/1
5 TABLET ORAL DAILY
Qty: 30 TAB | Refills: 0 | Status: SHIPPED | OUTPATIENT
Start: 2020-09-14 | End: 2020-09-16 | Stop reason: SDUPTHER

## 2020-09-14 NOTE — PATIENT INSTRUCTIONS
Call to make an appointment with a mental health provider AS SOON AS POSSIBLE Call our office AS SOON AS POSSIBLE to set up your 2 week appointment with Dr. Nate Beard

## 2020-09-14 NOTE — PROGRESS NOTES
Chief Complaint   Patient presents with    Stress     Reviewed record in preparation for visit and have obtained necessary documentation. Identified pt with two pt identifiers(name and ). Health Maintenance Due   Topic    Pneumococcal 0-64 years (1 of 1 - PPSV23)    DTaP/Tdap/Td series (1 - Tdap)    Lipid Screen     Flu Vaccine (1)         Chief Complaint   Patient presents with    Stress        Wt Readings from Last 3 Encounters:   20 177 lb 12.8 oz (80.6 kg)   19 210 lb 6.4 oz (95.4 kg)   19 208 lb 6.4 oz (94.5 kg)     Temp Readings from Last 3 Encounters:   20 98 °F (36.7 °C) (Temporal)   20 100.1 °F (37.8 °C)   19 98.7 °F (37.1 °C) (Oral)     BP Readings from Last 3 Encounters:   20 (!) 140/80   19 (!) 164/118   19 (!) 215/136     Pulse Readings from Last 3 Encounters:   20 74   20 88   19 83           Learning Assessment:  :     No flowsheet data found. Depression Screening:  :     3 most recent PHQ Screens 2019   Little interest or pleasure in doing things Not at all   Feeling down, depressed, irritable, or hopeless Not at all   Total Score PHQ 2 0       Fall Risk Assessment:  :     No flowsheet data found. Abuse Screening:  :     No flowsheet data found. Coordination of Care Questionnaire:  :     1) Have you been to an emergency room, urgent care clinic since your last visit? no   Hospitalized since your last visit? no             2) Have you seen or consulted any other health care providers outside of [address] [place] [address] since your last visit? no  (Include any pap smears or colon screenings in this section.)    3) Do you have an Advance Directive on file? no    4) Are you interested in receiving information on Advance Directives?  NO      Patient is accompanied by self I have received verbal consent from Lidya Nix to discuss any/all medical information while they are present in the room.    Reviewed record  In preparation for visit and have obtained necessary documentation.

## 2020-09-14 NOTE — PROGRESS NOTES
Reached out to patient at [phone] for follow up. Patient's voicemail came on and no message left due to confidentiality.

## 2020-09-14 NOTE — PROGRESS NOTES
Mr. Austyn Harris is a 38y.o. year old male, he is seen today for Transition of Care services following a hospital discharge for Bipolar 1 Disorder on 9/11/2020. Mr. Yaya Braswell is seen within 2 business days of discharge  . 39 yo male here today with c/o \"stress\", anxiety and \"slight depression\". He reports these are why \"I drink so much\". He reports no previous tx with meds for his sxs. This AM when he got to work, he felt some chest tightness which lasted for about an hour \"until I got here\". He feels the condition of his work environment is what set him off in this direction. On review of chart, he was discharged from [facility] after 48 hr Psychiatric floor stay w/ D/C Dx: Bipolar 1 Disorder. He was discharged on Naltrexone (he feels this is reducing his alcohol desire), and he was advised to continue Gabapentin (which he has been on for several years, also for his alcoholism). He reports he presented to the ED with what was interpreted as a suicidal ideation, so he was admitted \"to the [facility]\". He reports his hospitalization was \"horrible\". He had hoped to simple get some medication for his anxiety. He want through detox,   BP remained elevated during his hospitalization, and he reports he was not given his BP med for several days; instead he reports he was given Clonidine when his BP was elevated. He reports he continues to take Lotrel 5/20 and Metoprolol XL-25. He was last seen by his PCP, Dr. Leopoldo Eastman 12/17/2019 at which time he was advised to return in 1 month (BP at that visit : 164/118). On 8/21, BP while being seen at [address] [place] [address] for acute illness was not documented. He is a tobacco user of 1 ppd, and current alcohol consumption is: currently none since hospitalization  (hx: chronic alcoholism). He feels better now that the alcohol is \"out of my system\". His 12 yo daughter got into trouble while he was gone. His work is stressful 10-12 hours/day.   His work sight is a mess, and turnover of employees is terrible. He builds sheds. Sleep is usually 11pm to 5am.      PE: WNWD WM is alert   BP = 140/80 and on repeat = 134/86   Heart -  RRR   Lungs - clear    Imp: Hospital Discharge fol up   Bipolar 1 disorder    Chronic Alcoholism   HTN    Plan: He has been given the number of a Mental Health provider, but has not called yet to set that up   (discussed with Dr. Shaheen Watts)   Start Lexapro 5 mg daily #30 no RF   Emphasized importance of mental health consultation   See Dr. Shaheen Watts in 2 weeks  _______________________________  Expected course of current diagnosed problem(s) as well as expected progression and possible complications, and desired follow up with provider are discussed with patient. Patient is encouraged to be back in touch with any questions or concerns. Patient expresses understanding of plan of care. Patient is given AVS which includes diagnoses, current medications, vitals.

## 2020-09-16 ENCOUNTER — OFFICE VISIT (OUTPATIENT)
Dept: INTERNAL MEDICINE CLINIC | Age: 45
End: 2020-09-16
Payer: COMMERCIAL

## 2020-09-16 VITALS
WEIGHT: 173 LBS | DIASTOLIC BLOOD PRESSURE: 97 MMHG | TEMPERATURE: 97.5 F | SYSTOLIC BLOOD PRESSURE: 140 MMHG | OXYGEN SATURATION: 98 % | HEART RATE: 81 BPM | BODY MASS INDEX: 27.8 KG/M2 | RESPIRATION RATE: 18 BRPM | HEIGHT: 66 IN

## 2020-09-16 DIAGNOSIS — I10 ESSENTIAL HYPERTENSION: ICD-10-CM

## 2020-09-16 DIAGNOSIS — F41.9 ANXIETY: Primary | ICD-10-CM

## 2020-09-16 DIAGNOSIS — F10.10 ALCOHOL ABUSE: ICD-10-CM

## 2020-09-16 PROCEDURE — 99214 OFFICE O/P EST MOD 30 MIN: CPT | Performed by: INTERNAL MEDICINE

## 2020-09-16 RX ORDER — ESCITALOPRAM OXALATE 5 MG/1
10 TABLET ORAL DAILY
Qty: 1 TAB | Refills: 0
Start: 2020-09-16 | End: 2020-09-30

## 2020-09-16 NOTE — PROGRESS NOTES
HISTORY OF PRESENT ILLNESS  Luis Eugene is a 38 y.o. male. HPI Subjective:  Karyna Caballero is seen today for follow up of anxiety, recent hospital admission for suicidal ideations and other concerns. 1. Anxiety with mild depression. Lexapro was started earlier this week. He wanted to review treatment. I strongly encouraged him to get a psychiatry consultation, but we can increase the Lexapro to 10 mg daily. 2. Alcoholism. He had a relapse last year. He is now on Naltrexone and went through detox without complication. He really feels the Naltrexone is helpful. 3. Hypertension, slight elevation, but was better in the hospital.  Continue his meds for now. Past Medical History:  Notable for a new diagnosis of bipolar disorder. I am not sure what the basis for this is and therefore I think he really does need a psychiatry referral.  I gave him multiple names, as well as the [facility] for addictive medicine. Review of Systems   Constitutional: Negative for chills and fever. Psychiatric/Behavioral: Positive for depression and substance abuse. Negative for suicidal ideas. The patient is nervous/anxious. Physical Exam  Vitals signs and nursing note reviewed. Skin:     General: Skin is warm and dry. Neurological:      Mental Status: He is alert. Psychiatric:         Mood and Affect: Mood normal.         Behavior: Behavior normal.         Thought Content: Thought content normal.         ASSESSMENT and PLAN  Diagnoses and all orders for this visit:    1. Anxiety  -     escitalopram oxalate (LEXAPRO) 5 mg tablet; Take 2 Tabs by mouth daily. Indications: anxiousness associated with depression  -     REFERRAL TO PSYCHIATRY    2. Essential hypertension- Continue current regimen of prescription and / or OTC medications     3.  Alcohol abuse  -     REFERRAL TO PSYCHIATRY

## 2020-09-30 ENCOUNTER — OFFICE VISIT (OUTPATIENT)
Dept: INTERNAL MEDICINE CLINIC | Age: 45
End: 2020-09-30
Payer: COMMERCIAL

## 2020-09-30 VITALS
RESPIRATION RATE: 20 BRPM | WEIGHT: 177.8 LBS | SYSTOLIC BLOOD PRESSURE: 130 MMHG | HEIGHT: 66 IN | BODY MASS INDEX: 28.57 KG/M2 | TEMPERATURE: 97.8 F | DIASTOLIC BLOOD PRESSURE: 85 MMHG | HEART RATE: 67 BPM

## 2020-09-30 DIAGNOSIS — F41.9 ANXIETY: ICD-10-CM

## 2020-09-30 DIAGNOSIS — I10 ESSENTIAL HYPERTENSION: Primary | ICD-10-CM

## 2020-09-30 DIAGNOSIS — F10.10 ALCOHOL ABUSE: ICD-10-CM

## 2020-09-30 DIAGNOSIS — M54.41 CHRONIC BILATERAL LOW BACK PAIN WITH RIGHT-SIDED SCIATICA: ICD-10-CM

## 2020-09-30 DIAGNOSIS — G89.29 CHRONIC BILATERAL LOW BACK PAIN WITH RIGHT-SIDED SCIATICA: ICD-10-CM

## 2020-09-30 PROCEDURE — 99214 OFFICE O/P EST MOD 30 MIN: CPT | Performed by: INTERNAL MEDICINE

## 2020-09-30 RX ORDER — ESCITALOPRAM OXALATE 10 MG/1
15 TABLET ORAL DAILY
Qty: 45 TAB | Refills: 3 | Status: SHIPPED | OUTPATIENT
Start: 2020-09-30 | End: 2020-11-30

## 2020-09-30 RX ORDER — DICLOFENAC SODIUM 75 MG/1
75 TABLET, DELAYED RELEASE ORAL
Qty: 40 TAB | Refills: 2 | Status: SHIPPED | OUTPATIENT
Start: 2020-09-30

## 2020-09-30 NOTE — PROGRESS NOTES
HISTORY OF PRESENT ILLNESS  Monique Bolton is a 38 y.o. male. MAYA Pulliam is seen today for follow up of anxiety and other problems. 1. Anxiety is about the same with Lexapro; depression has improved, however. 2. Hypertension stable on current regimen. 3. Chronic alcohol abuse remining in remission. He is now on naltrexone, which has really helped to suppress his cravings for alcohol. He is also in counseling at [facility].   4. Chronic back pain with lumbar pain at 6/10 range. He has not ever had a formal evaluation. He may be a candidate for PMR consultation. We will assess this in 2 months. I would like to make sure he is stable medically with his other problems before embarking on any spine workup. Current Outpatient Medications   Medication Sig    escitalopram oxalate (LEXAPRO) 10 mg tablet Take 1.5 Tabs by mouth daily. New dose, directions  Indications: anxiousness associated with depression    diclofenac EC (VOLTAREN) 75 mg EC tablet Take 1 Tab by mouth two (2) times daily as needed for Pain.  amLODIPine-benazepril (LOTREL) 5-20 mg per capsule Take 1 capsule by mouth once daily    metoprolol succinate (TOPROL-XL) 25 mg XL tablet Take 1 tablet by mouth nightly    gabapentin (NEURONTIN) 600 mg tablet Take 1 Tab by mouth three (3) times daily. Max Daily Amount: 1,800 mg. Indications: alcoholism    naltrexone (DEPADE) 50 mg tablet Take 1 Tab by mouth daily. Indications: alcoholism     No current facility-administered medications for this visit. Review of Systems   Constitutional: Negative for chills and fever. Musculoskeletal: Positive for back pain. Psychiatric/Behavioral: Negative for depression. The patient is nervous/anxious. Physical Exam  Vitals signs and nursing note reviewed. Skin:     General: Skin is warm and dry. Neurological:      Mental Status: He is alert.    Psychiatric:         Mood and Affect: Mood normal.         Behavior: Behavior normal.         Thought Content: Thought content normal.         ASSESSMENT and PLAN  Diagnoses and all orders for this visit:    1. Essential hypertension- Continue current regimen of prescription and / or OTC medications     2. Anxiety  -     escitalopram oxalate (LEXAPRO) 10 mg tablet; Take 1.5 Tabs by mouth daily. New dose, directions  Indications: anxiousness associated with depression    3. Alcohol abuse - See psychiatrist as directed     4. Chronic bilateral low back pain with right-sided sciatica  -     diclofenac EC (VOLTAREN) 75 mg EC tablet; Take 1 Tab by mouth two (2) times daily as needed for Pain.

## 2020-10-27 DIAGNOSIS — F10.11 ALCOHOL ABUSE, IN REMISSION: ICD-10-CM

## 2020-10-27 NOTE — TELEPHONE ENCOUNTER
Future Appointments   Date Time Provider Lori Leon   11/30/2020  3:30 PM Nilesh Riley MD [facility]

## 2020-10-28 RX ORDER — GABAPENTIN 600 MG/1
600 TABLET ORAL 3 TIMES DAILY
Qty: 90 TAB | Refills: 3 | Status: SHIPPED | OUTPATIENT
Start: 2020-10-28

## 2020-11-30 ENCOUNTER — OFFICE VISIT (OUTPATIENT)
Dept: INTERNAL MEDICINE CLINIC | Age: 45
End: 2020-11-30
Payer: COMMERCIAL

## 2020-11-30 VITALS
RESPIRATION RATE: 16 BRPM | OXYGEN SATURATION: 94 % | HEART RATE: 70 BPM | TEMPERATURE: 97.6 F | SYSTOLIC BLOOD PRESSURE: 148 MMHG | BODY MASS INDEX: 28.41 KG/M2 | WEIGHT: 176 LBS | DIASTOLIC BLOOD PRESSURE: 87 MMHG

## 2020-11-30 DIAGNOSIS — F10.11 ALCOHOL ABUSE, IN REMISSION: Primary | ICD-10-CM

## 2020-11-30 DIAGNOSIS — G89.29 CHRONIC BILATERAL LOW BACK PAIN WITH RIGHT-SIDED SCIATICA: ICD-10-CM

## 2020-11-30 DIAGNOSIS — M54.41 CHRONIC BILATERAL LOW BACK PAIN WITH RIGHT-SIDED SCIATICA: ICD-10-CM

## 2020-11-30 DIAGNOSIS — I10 ESSENTIAL HYPERTENSION: ICD-10-CM

## 2020-11-30 DIAGNOSIS — F41.9 ANXIETY: ICD-10-CM

## 2020-11-30 PROCEDURE — 99214 OFFICE O/P EST MOD 30 MIN: CPT | Performed by: INTERNAL MEDICINE

## 2020-11-30 RX ORDER — ESCITALOPRAM OXALATE 20 MG/1
20 TABLET ORAL DAILY
Qty: 30 TAB | Refills: 5 | Status: SHIPPED | OUTPATIENT
Start: 2020-11-30

## 2020-11-30 RX ORDER — AMLODIPINE AND BENAZEPRIL HYDROCHLORIDE 5; 40 MG/1; MG/1
1 CAPSULE ORAL DAILY
Qty: 30 CAP | Refills: 5 | Status: SHIPPED | OUTPATIENT
Start: 2020-11-30

## 2020-11-30 NOTE — PROGRESS NOTES
HISTORY OF PRESENT ILLNESS  Omar Kim is a 54 y.o. male. MAYA Galicia is seen today for follow up of hypertension and other problems. 1. Hypertension. Stable on current regimen. 2. Depression with anxiety. Stable on Lexapro. 3. Chronic back pain. No change in his symptom status with routine anti-inflammatory therapy. He has not had a workup or formal evaluation in some time. Will check x-rays and refer to Dr. Saintclair Franco for pain management. 4. Alcoholism. No relapse. He follows up with psychiatry as directed. Current Outpatient Medications   Medication Sig    amLODIPine-benazepril (LOTREL) 5-40 mg per capsule Take 1 Cap by mouth daily. New dosage    escitalopram oxalate (LEXAPRO) 20 mg tablet Take 1 Tab by mouth daily. New dose, directions  Indications: anxiousness associated with depression    gabapentin (NEURONTIN) 600 mg tablet Take 1 Tab by mouth three (3) times daily. Max Daily Amount: 1,800 mg. Indications: alcoholism    diclofenac EC (VOLTAREN) 75 mg EC tablet Take 1 Tab by mouth two (2) times daily as needed for Pain.  metoprolol succinate (TOPROL-XL) 25 mg XL tablet Take 1 tablet by mouth nightly    naltrexone (DEPADE) 50 mg tablet Take 1 Tab by mouth daily. Indications: alcoholism     No current facility-administered medications for this visit. Review of Systems   Constitutional: Negative for chills and fever. Musculoskeletal: Positive for joint pain. Psychiatric/Behavioral: Negative for depression. The patient is not nervous/anxious. Physical Exam  Vitals signs and nursing note reviewed. Constitutional:       General: He is not in acute distress. Neck:      Vascular: No carotid bruit. Cardiovascular:      Rate and Rhythm: Normal rate and regular rhythm. Heart sounds: No murmur. No friction rub. No gallop. Pulmonary:      Effort: Pulmonary effort is normal. No respiratory distress. Breath sounds: Normal breath sounds.          ASSESSMENT and PLAN  Diagnoses and all orders for this visit:    1. Alcohol abuse, in remission- See psychiatrist as directed     2. Essential hypertension  -     amLODIPine-benazepril (LOTREL) 5-40 mg per capsule; Take 1 Cap by mouth daily. New dosage    3. Anxiety  -     escitalopram oxalate (LEXAPRO) 20 mg tablet; Take 1 Tab by mouth daily. New dose, directions  Indications: anxiousness associated with depression    4.  Chronic bilateral low back pain with right-sided sciatica  -     REFERRAL TO PAIN MANAGEMENT

## 2020-11-30 NOTE — PROGRESS NOTES
Verified name and birth date for privacy precautions. Chart reviewed in preparation for today's visit.      Chief Complaint   Patient presents with    Hypertension          Health Maintenance Due   Topic    Pneumococcal 0-64 years (1 of 1 - PPSV23)    DTaP/Tdap/Td series (1 - Tdap)    Lipid Screen     Flu Vaccine (1)         Wt Readings from Last 3 Encounters:   11/30/20 176 lb (79.8 kg)   09/30/20 177 lb 12.8 oz (80.6 kg)   09/16/20 173 lb (78.5 kg)     Temp Readings from Last 3 Encounters:   11/30/20 97.6 °F (36.4 °C) (Temporal)   09/30/20 97.8 °F (36.6 °C) (Temporal)   09/16/20 97.5 °F (36.4 °C) (Temporal)     BP Readings from Last 3 Encounters:   11/30/20 (!) 142/93   09/30/20 130/85   09/16/20 (!) 140/97     Pulse Readings from Last 3 Encounters:   11/30/20 70   09/30/20 67   09/16/20 81

## 2021-01-14 ENCOUNTER — TRANSCRIBE ORDER (OUTPATIENT)
Dept: GENERAL RADIOLOGY | Age: 46
End: 2021-01-14

## 2021-01-14 ENCOUNTER — HOSPITAL ENCOUNTER (OUTPATIENT)
Dept: GENERAL RADIOLOGY | Age: 46
Discharge: HOME | End: 2021-01-14
Attending: PAIN MEDICINE
Payer: COMMERCIAL

## 2021-01-14 DIAGNOSIS — M47.816 LUMBAR SPONDYLOSIS: ICD-10-CM

## 2021-01-14 DIAGNOSIS — M47.816 LUMBAR SPONDYLOSIS: Primary | ICD-10-CM

## 2021-01-14 PROCEDURE — 72114 X-RAY EXAM L-S SPINE BENDING: CPT

## 2021-01-25 ENCOUNTER — HOSPITAL ENCOUNTER (OUTPATIENT)
Dept: GENERAL RADIOLOGY | Age: 46
Discharge: HOME | End: 2021-01-25
Attending: PAIN MEDICINE
Payer: COMMERCIAL

## 2021-01-25 ENCOUNTER — TRANSCRIBE ORDER (OUTPATIENT)
Dept: GENERAL RADIOLOGY | Age: 46
End: 2021-01-25

## 2021-01-25 DIAGNOSIS — M47.814 THORACIC SPONDYLOSIS: ICD-10-CM

## 2021-01-25 DIAGNOSIS — M47.812 CERVICAL SPONDYLOSIS: Primary | ICD-10-CM

## 2021-01-25 DIAGNOSIS — M47.816 LUMBAR SPONDYLOSIS: ICD-10-CM

## 2021-01-25 DIAGNOSIS — M47.812 CERVICAL SPONDYLOSIS: ICD-10-CM

## 2021-01-25 PROCEDURE — 72052 X-RAY EXAM NECK SPINE 6/>VWS: CPT

## 2021-01-25 PROCEDURE — 72110 X-RAY EXAM L-2 SPINE 4/>VWS: CPT

## 2021-01-25 PROCEDURE — 72070 X-RAY EXAM THORAC SPINE 2VWS: CPT

## 2021-04-15 ENCOUNTER — VIRTUAL VISIT (OUTPATIENT)
Dept: INTERNAL MEDICINE CLINIC | Age: 46
End: 2021-04-15
Payer: COMMERCIAL

## 2021-04-15 DIAGNOSIS — M54.41 CHRONIC LOW BACK PAIN WITH RIGHT-SIDED SCIATICA, UNSPECIFIED BACK PAIN LATERALITY: Primary | ICD-10-CM

## 2021-04-15 DIAGNOSIS — G89.29 CHRONIC LOW BACK PAIN WITH RIGHT-SIDED SCIATICA, UNSPECIFIED BACK PAIN LATERALITY: Primary | ICD-10-CM

## 2021-04-15 PROCEDURE — 99214 OFFICE O/P EST MOD 30 MIN: CPT | Performed by: INTERNAL MEDICINE

## 2021-04-15 RX ORDER — METHYLPREDNISOLONE 4 MG/1
4 TABLET ORAL
Qty: 1 DOSE PACK | Refills: 0 | Status: SHIPPED | OUTPATIENT
Start: 2021-04-15

## 2021-04-15 NOTE — PROGRESS NOTES
Kaila Simon is a 54 y.o. male who was seen by synchronous (real-time) audio-video technology on 4/15/2021. He confirmed that, for purposes of billing, this is a virtual visit with his provider for which we will submit a claim for reimbursement to his insurance company. He is aware that he will be responsible for any copays, coinsurance amounts or other amounts not covered by his insurance company. Do you accept - YES    This visit was completed was completed virtually using 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 He has been seen by pain management (Dr. Missael Farrell) for this pain and has been treated with injections of steroid which he notes have not been as effective as he would like. He has not followed up with Dr. Missael Farrell since the last injection. We discussed that oral steroids may help but that he needs to have a follow up with the specialist.    Prior to Admission medications    Medication Sig Start Date End Date Taking? Authorizing Provider   amLODIPine-benazepril (LOTREL) 5-40 mg per capsule Take 1 Cap by mouth daily. New dosage 11/30/20  Yes Vanda Riley MD   escitalopram oxalate (LEXAPRO) 20 mg tablet Take 1 Tab by mouth daily. New dose, directions  Indications: anxiousness associated with depression 11/30/20  Yes Vanda Riley MD   gabapentin (NEURONTIN) 600 mg tablet Take 1 Tab by mouth three (3) times daily. Max Daily Amount: 1,800 mg. Indications: alcoholism 10/28/20  Yes Vanda Riley MD   metoprolol succinate (TOPROL-XL) 25 mg XL tablet Take 1 tablet by mouth nightly 9/19/20  Yes Vanda Riley MD   diclofenac EC (VOLTAREN) 75 mg EC tablet Take 1 Tab by mouth two (2) times daily as needed for Pain.  9/30/20   Vanda Riley MD       Allergies   Allergen Reactions  Lisinopril Other (comments)     \"renal failure\"        Patient Active Problem List    Diagnosis Date Noted    Bipolar 1 disorder ([facility] [address] 09/08/2020     Current Outpatient Medications   Medication Sig Dispense Refill    methylPREDNISolone (MEDROL DOSEPACK) 4 mg tablet Take 1 Tab by mouth Specific Days and Specific Times. 1 Dose Pack 0    amLODIPine-benazepril (LOTREL) 5-40 mg per capsule Take 1 Cap by mouth daily. New dosage 30 Cap 5    escitalopram oxalate (LEXAPRO) 20 mg tablet Take 1 Tab by mouth daily. New dose, directions  Indications: anxiousness associated with depression 30 Tab 5    gabapentin (NEURONTIN) 600 mg tablet Take 1 Tab by mouth three (3) times daily. Max Daily Amount: 1,800 mg. Indications: alcoholism 90 Tab 3    metoprolol succinate (TOPROL-XL) 25 mg XL tablet Take 1 tablet by mouth nightly 30 Tab 5    diclofenac EC (VOLTAREN) 75 mg EC tablet Take 1 Tab by mouth two (2) times daily as needed for Pain. 40 Tab 2     Allergies   Allergen Reactions    Lisinopril Other (comments)     \"renal failure\"     Past Medical History:   Diagnosis Date    Alcohol abuse     Alcoholic hepatitis     Anxiety     Atrial fibrillation ([facility] [address]     Dental injury 10/2018    Hypertension     Ill-defined condition     a-fib    Renal failure      No past surgical history on file.   Family History   Problem Relation Age of Onset   [address] Breast Cancer Mother     Hypertension Father     Hypertension Brother     Heart Disease Maternal Grandfather     Alcohol abuse Maternal Grandmother     Alcohol abuse Paternal Grandfather      Social History     Tobacco Use    Smoking status: Current Every Day Smoker     Packs/day: 0.50    Smokeless tobacco: Never Used   Substance Use Topics    Alcohol use: Yes     Frequency: Never     Comment: has not had a drink since 7/16/2018 - rehab          ROS - per HPI      Objective:     General: alert, cooperative, no distress   Mental  status: pe mental status_general use: normal mood, behavior, speech, dress, motor activity, and thought processes, able to follow commands   Eyes: EOM intact, normal sclera   Mouth: mucous membranes moist   Neck: no visualized mass   Resp: PULM - obs findings: normal effort and no respiratory distress   Neuro: neuro - obs: no gross deficits   Musculoskeletal: normal ROM of neck and normal gait w/o ataxia   Skin: skin exam: no discoloration or lesions of concern on visible areas   Psychiatric: normal affect, no hallucinations           Assessment & Plan:   1. Chronic low back pain with right-sided sciatica, unspecified back pain laterality - Discussed with the patient that he may need to have a further follow up with Dr. Jeremias Ariza to determine the next steps. I will provide a medrol pack and a letter for him to be relieved of work duty until 4/19. The patient endorses understanding.   -     methylPREDNISolone (MEDROL DOSEPACK) 4 mg tablet; Take 1 Tab by mouth Specific Days and Specific Times., Normal, Disp-1 Dose Pack, R-0          Due to this being a TeleHealth evaluation, many elements of the physical examination are unable to be assessed. Pursuant to the emergency declaration under the [facility], [facility] waiver authority and the [employer] and [employer] Act, this Virtual  Visit was conducted, with patient's consent, to reduce the patient's risk of exposure to COVID-19 and provide continuity of care for an established patient. Services were provided through a video synchronous discussion virtually to substitute for in-person clinic visit. We discussed the expected course, resolution and complications of the diagnosis(es) in detail. Medication risks, benefits, costs, interactions, and alternatives were discussed as indicated. I advised him to contact the office if his condition worsens, changes or fails to improve as anticipated.  He expressed understanding with the diagnosis(es) and plan.      Karina Patel MD

## 2021-05-27 DIAGNOSIS — I10 ACCELERATED HYPERTENSION: ICD-10-CM

## 2021-05-27 DIAGNOSIS — I10 ESSENTIAL HYPERTENSION: ICD-10-CM

## 2021-05-28 RX ORDER — METOPROLOL SUCCINATE 25 MG/1
TABLET, EXTENDED RELEASE ORAL
Qty: 30 TABLET | Refills: 0 | Status: SHIPPED | OUTPATIENT
Start: 2021-05-28 | End: 2021-07-16

## 2021-07-15 DIAGNOSIS — I10 ACCELERATED HYPERTENSION: ICD-10-CM

## 2021-07-15 DIAGNOSIS — I10 ESSENTIAL HYPERTENSION: ICD-10-CM

## 2021-07-16 RX ORDER — METOPROLOL SUCCINATE 25 MG/1
TABLET, EXTENDED RELEASE ORAL
Qty: 30 TABLET | Refills: 0 | Status: SHIPPED | OUTPATIENT
Start: 2021-07-16

## 2025-03-20 NOTE — TELEPHONE ENCOUNTER
----- Message from Asher Rosario MD sent at 1/28/2019  5:24 PM EST -----  Please inform the patient that the x-ray of his ankle shows no acute abnormality. There is no evidence of soft tissue injury either. For now, this may be a sprain. Please utilize ice and rest as we had discussed. Inform the office if not improving in the next week.
Informed patient provider result note. Patient verbalized understanding.
n/a